# Patient Record
Sex: MALE | Race: BLACK OR AFRICAN AMERICAN | Employment: OTHER | URBAN - METROPOLITAN AREA
[De-identification: names, ages, dates, MRNs, and addresses within clinical notes are randomized per-mention and may not be internally consistent; named-entity substitution may affect disease eponyms.]

---

## 2017-01-23 ENCOUNTER — APPOINTMENT (OUTPATIENT)
Dept: CT IMAGING | Age: 72
End: 2017-01-23
Attending: EMERGENCY MEDICINE
Payer: MEDICARE

## 2017-01-23 ENCOUNTER — APPOINTMENT (OUTPATIENT)
Dept: GENERAL RADIOLOGY | Age: 72
End: 2017-01-23
Attending: EMERGENCY MEDICINE
Payer: MEDICARE

## 2017-01-23 ENCOUNTER — HOSPITAL ENCOUNTER (EMERGENCY)
Age: 72
Discharge: HOME | End: 2017-01-23
Attending: EMERGENCY MEDICINE
Payer: MEDICARE

## 2017-01-23 VITALS
WEIGHT: 175 LBS | HEART RATE: 62 BPM | HEIGHT: 68 IN | SYSTOLIC BLOOD PRESSURE: 114 MMHG | OXYGEN SATURATION: 99 % | DIASTOLIC BLOOD PRESSURE: 62 MMHG | TEMPERATURE: 98.2 F | BODY MASS INDEX: 26.52 KG/M2 | RESPIRATION RATE: 20 BRPM

## 2017-01-23 DIAGNOSIS — K86.89 PANCREATIC MASS: ICD-10-CM

## 2017-01-23 DIAGNOSIS — M89.8X5 LYTIC BONE LESION OF HIP: ICD-10-CM

## 2017-01-23 DIAGNOSIS — G89.29 CHRONIC MIDLINE LOW BACK PAIN WITHOUT SCIATICA: Primary | ICD-10-CM

## 2017-01-23 DIAGNOSIS — M54.50 CHRONIC MIDLINE LOW BACK PAIN WITHOUT SCIATICA: Primary | ICD-10-CM

## 2017-01-23 LAB
ALBUMIN SERPL BCP-MCNC: 3.8 G/DL (ref 3.2–4.6)
ALBUMIN/GLOB SERPL: 1.1 {RATIO} (ref 1.2–3.5)
ALP SERPL-CCNC: 83 U/L (ref 50–136)
ALT SERPL-CCNC: 27 U/L (ref 12–65)
ANION GAP BLD CALC-SCNC: 9 MMOL/L (ref 7–16)
AST SERPL W P-5'-P-CCNC: 17 U/L (ref 15–37)
ATRIAL RATE: 56 BPM
BACTERIA URNS QL MICRO: 0 /HPF
BASOPHILS # BLD AUTO: 0 K/UL (ref 0–0.2)
BASOPHILS # BLD: 1 % (ref 0–2)
BILIRUB SERPL-MCNC: 0.3 MG/DL (ref 0.2–1.1)
BUN SERPL-MCNC: 16 MG/DL (ref 8–23)
CALCIUM SERPL-MCNC: 8.6 MG/DL (ref 8.3–10.4)
CALCULATED P AXIS, ECG09: 74 DEGREES
CALCULATED R AXIS, ECG10: 48 DEGREES
CALCULATED T AXIS, ECG11: 34 DEGREES
CASTS URNS QL MICRO: NORMAL /LPF
CHLORIDE SERPL-SCNC: 109 MMOL/L (ref 98–107)
CO2 SERPL-SCNC: 27 MMOL/L (ref 21–32)
CREAT SERPL-MCNC: 1.57 MG/DL (ref 0.8–1.5)
DIAGNOSIS, 93000: NORMAL
DIASTOLIC BP, ECG02: NORMAL MMHG
DIFFERENTIAL METHOD BLD: ABNORMAL
EOSINOPHIL # BLD: 0.1 K/UL (ref 0–0.8)
EOSINOPHIL NFR BLD: 3 % (ref 0.5–7.8)
EPI CELLS #/AREA URNS HPF: 0 /HPF
ERYTHROCYTE [DISTWIDTH] IN BLOOD BY AUTOMATED COUNT: 13.7 % (ref 11.9–14.6)
FLUAV AG NPH QL IA: NEGATIVE
FLUBV AG NPH QL IA: NEGATIVE
GLOBULIN SER CALC-MCNC: 3.4 G/DL (ref 2.3–3.5)
GLUCOSE BLD STRIP.AUTO-MCNC: 85 MG/DL (ref 65–100)
GLUCOSE SERPL-MCNC: 47 MG/DL (ref 65–100)
HCT VFR BLD AUTO: 42 % (ref 41.1–50.3)
HGB BLD-MCNC: 14 G/DL (ref 13.6–17.2)
IMM GRANULOCYTES # BLD: 0 K/UL (ref 0–0.5)
IMM GRANULOCYTES NFR BLD AUTO: 0.3 % (ref 0–5)
LACTATE BLD-SCNC: 0.8 MMOL/L (ref 0.5–1.9)
LIPASE SERPL-CCNC: 136 U/L (ref 73–393)
LYMPHOCYTES # BLD AUTO: 47 % (ref 13–44)
LYMPHOCYTES # BLD: 1.9 K/UL (ref 0.5–4.6)
MCH RBC QN AUTO: 31.1 PG (ref 26.1–32.9)
MCHC RBC AUTO-ENTMCNC: 33.3 G/DL (ref 31.4–35)
MCV RBC AUTO: 93.3 FL (ref 79.6–97.8)
MONOCYTES # BLD: 0.3 K/UL (ref 0.1–1.3)
MONOCYTES NFR BLD AUTO: 8 % (ref 4–12)
NEUTS SEG # BLD: 1.6 K/UL (ref 1.7–8.2)
NEUTS SEG NFR BLD AUTO: 41 % (ref 43–78)
P-R INTERVAL, ECG05: 180 MS
PLATELET # BLD AUTO: 183 K/UL (ref 150–450)
PMV BLD AUTO: 11.4 FL (ref 10.8–14.1)
POTASSIUM SERPL-SCNC: 3.8 MMOL/L (ref 3.5–5.1)
PROT SERPL-MCNC: 7.2 G/DL (ref 6.3–8.2)
Q-T INTERVAL, ECG07: 424 MS
QRS DURATION, ECG06: 92 MS
QTC CALCULATION (BEZET), ECG08: 409 MS
RBC # BLD AUTO: 4.5 M/UL (ref 4.23–5.67)
RBC #/AREA URNS HPF: NORMAL /HPF
SODIUM SERPL-SCNC: 145 MMOL/L (ref 136–145)
SYSTOLIC BP, ECG01: NORMAL MMHG
TROPONIN I SERPL-MCNC: <0.02 NG/ML (ref 0.02–0.05)
VENTRICULAR RATE, ECG03: 56 BPM
WBC # BLD AUTO: 3.9 K/UL (ref 4.3–11.1)
WBC URNS QL MICRO: NORMAL /HPF

## 2017-01-23 PROCEDURE — 82962 GLUCOSE BLOOD TEST: CPT

## 2017-01-23 PROCEDURE — 81015 MICROSCOPIC EXAM OF URINE: CPT | Performed by: EMERGENCY MEDICINE

## 2017-01-23 PROCEDURE — 85025 COMPLETE CBC W/AUTO DIFF WBC: CPT | Performed by: EMERGENCY MEDICINE

## 2017-01-23 PROCEDURE — 84484 ASSAY OF TROPONIN QUANT: CPT | Performed by: EMERGENCY MEDICINE

## 2017-01-23 PROCEDURE — 99284 EMERGENCY DEPT VISIT MOD MDM: CPT | Performed by: EMERGENCY MEDICINE

## 2017-01-23 PROCEDURE — 93005 ELECTROCARDIOGRAM TRACING: CPT | Performed by: EMERGENCY MEDICINE

## 2017-01-23 PROCEDURE — 74177 CT ABD & PELVIS W/CONTRAST: CPT

## 2017-01-23 PROCEDURE — 87804 INFLUENZA ASSAY W/OPTIC: CPT | Performed by: EMERGENCY MEDICINE

## 2017-01-23 PROCEDURE — 74011250636 HC RX REV CODE- 250/636: Performed by: EMERGENCY MEDICINE

## 2017-01-23 PROCEDURE — 80053 COMPREHEN METABOLIC PANEL: CPT | Performed by: EMERGENCY MEDICINE

## 2017-01-23 PROCEDURE — 83690 ASSAY OF LIPASE: CPT | Performed by: EMERGENCY MEDICINE

## 2017-01-23 PROCEDURE — 96374 THER/PROPH/DIAG INJ IV PUSH: CPT | Performed by: EMERGENCY MEDICINE

## 2017-01-23 PROCEDURE — 83605 ASSAY OF LACTIC ACID: CPT

## 2017-01-23 PROCEDURE — 74011636320 HC RX REV CODE- 636/320: Performed by: EMERGENCY MEDICINE

## 2017-01-23 PROCEDURE — 71020 XR CHEST PA LAT: CPT

## 2017-01-23 PROCEDURE — 74011000258 HC RX REV CODE- 258: Performed by: EMERGENCY MEDICINE

## 2017-01-23 RX ORDER — HYDROCODONE BITARTRATE AND ACETAMINOPHEN 5; 325 MG/1; MG/1
1 TABLET ORAL
Qty: 20 TAB | Refills: 0 | Status: SHIPPED | OUTPATIENT
Start: 2017-01-23 | End: 2017-03-30 | Stop reason: ALTCHOICE

## 2017-01-23 RX ORDER — HYDROCODONE BITARTRATE AND ACETAMINOPHEN 5; 325 MG/1; MG/1
1 TABLET ORAL
Qty: 20 TAB | Refills: 0 | Status: SHIPPED | OUTPATIENT
Start: 2017-01-23 | End: 2017-01-23

## 2017-01-23 RX ORDER — SODIUM CHLORIDE 0.9 % (FLUSH) 0.9 %
5-10 SYRINGE (ML) INJECTION EVERY 8 HOURS
Status: DISCONTINUED | OUTPATIENT
Start: 2017-01-23 | End: 2017-01-23 | Stop reason: HOSPADM

## 2017-01-23 RX ORDER — SODIUM CHLORIDE 0.9 % (FLUSH) 0.9 %
5-10 SYRINGE (ML) INJECTION AS NEEDED
Status: DISCONTINUED | OUTPATIENT
Start: 2017-01-23 | End: 2017-01-23 | Stop reason: HOSPADM

## 2017-01-23 RX ORDER — SODIUM CHLORIDE 0.9 % (FLUSH) 0.9 %
10 SYRINGE (ML) INJECTION
Status: COMPLETED | OUTPATIENT
Start: 2017-01-23 | End: 2017-01-23

## 2017-01-23 RX ORDER — HYDROMORPHONE HYDROCHLORIDE 1 MG/ML
1 INJECTION, SOLUTION INTRAMUSCULAR; INTRAVENOUS; SUBCUTANEOUS
Status: COMPLETED | OUTPATIENT
Start: 2017-01-23 | End: 2017-01-23

## 2017-01-23 RX ADMIN — IOPAMIDOL 100 ML: 755 INJECTION, SOLUTION INTRAVENOUS at 17:37

## 2017-01-23 RX ADMIN — HYDROMORPHONE HYDROCHLORIDE 1 MG: 1 INJECTION, SOLUTION INTRAMUSCULAR; INTRAVENOUS; SUBCUTANEOUS at 15:30

## 2017-01-23 RX ADMIN — DIATRIZOATE MEGLUMINE AND DIATRIZOATE SODIUM 15 ML: 600; 100 SOLUTION ORAL; RECTAL at 15:30

## 2017-01-23 RX ADMIN — SODIUM CHLORIDE 100 ML: 900 INJECTION, SOLUTION INTRAVENOUS at 17:37

## 2017-01-23 RX ADMIN — Medication 10 ML: at 17:38

## 2017-01-23 NOTE — DISCHARGE INSTRUCTIONS
Learning About How to Have a Healthy Back  What causes back pain? Back pain is often caused by overuse, strain, or injury. For example, people often hurt their backs playing sports or working in the yard, being jolted in a car accident, or lifting something too heavy. Aging plays a part too. Your bones and muscles tend to lose strength as you age, which makes injury more likely. The spongy discs between the bones of the spine (vertebrae) may suffer from wear and tear and no longer provide enough cushion between the bones. A disc that bulges or breaks open (herniated disc) can press on nerves, causing back pain. In some people, back pain is the result of arthritis, broken vertebrae caused by bone loss (osteoporosis), illness, or a spine problem. Although most people have back pain at one time or another, there are steps you can take to make it less likely. How can you have a healthy back? Reduce stress on your back through good posture  Slumping or slouching alone may not cause low back pain. But after the back has been strained or injured, bad posture can make pain worse. · Sleep in a position that maintains your back's normal curves and on a mattress that feels comfortable. Sleep on your side with a pillow between your knees, or sleep on your back with a pillow under your knees. These positions can reduce strain on your back. · Stand and sit up straight. \"Good posture\" generally means your ears, shoulders, and hips are in a straight line. · If you must stand for a long time, put one foot on a stool, ledge, or box. Switch feet every now and then. · Sit in a chair that is low enough to let you place both feet flat on the floor with both knees nearly level with your hips. If your chair or desk is too high, use a footrest to raise your knees. Place a small pillow, a rolled-up towel, or a lumbar roll in the curve of your back if you need extra support.   · Try a kneeling chair, which helps tilt your hips forward. This takes pressure off your lower back. · Try sitting on an exercise ball. It can rock from side to side, which helps keep your back loose. · When driving, keep your knees nearly level with your hips. Sit straight, and drive with both hands on the steering wheel. Your arms should be in a slightly bent position. Reduce stress on your back through careful lifting  · Squat down, bending at the hips and knees only. If you need to, put one knee to the floor and extend your other knee in front of you, bent at a right angle (half kneeling). · Press your chest straight forward. This helps keep your upper back straight while keeping a slight arch in your low back. · Hold the load as close to your body as possible, at the level of your belly button (navel). · Use your feet to change direction, taking small steps. · Lead with your hips as you change direction. Keep your shoulders in line with your hips as you move. · Set down your load carefully, squatting with your knees and hips only. Exercise and stretch your back  · Do some exercise on most days of the week, if your doctor says it is okay. You can walk, run, swim, or cycle. · Stretch your back muscles. Here are a few exercises to try:  Kip Isauro on your back, and gently pull one bent knee to your chest. Put that foot back on the floor, and then pull the other knee to your chest.  ¨ Do pelvic tilts. Lie on your back with your knees bent. Tighten your stomach muscles. Pull your belly button (navel) in and up toward your ribs. You should feel like your back is pressing to the floor and your hips and pelvis are slightly lifting off the floor. Hold for 6 seconds while breathing smoothly. ¨ Sit with your back flat against a wall. · Keep your core muscles strong. The muscles of your back, belly (abdomen), and buttocks support your spine. ¨ Pull in your belly and imagine pulling your navel toward your spine. Hold this for 6 seconds, then relax.  Remember to keep breathing normally as you tense your muscles. ¨ Do curl-ups. Always do them with your knees bent. Keep your low back on the floor, and curl your shoulders toward your knees using a smooth, slow motion. Keep your arms folded across your chest. If this bothers your neck, try putting your hands behind your neck (not your head), with your elbows spread apart. ¨ Lie on your back with your knees bent and your feet flat on the floor. Tighten your belly muscles, and then push with your feet and raise your buttocks up a few inches. Hold this position 6 seconds as you continue to breathe normally, then lower yourself slowly to the floor. Repeat 8 to 12 times. ¨ If you like group exercise, try Pilates or yoga. These classes have poses that strengthen the core muscles. Lead a healthy lifestyle  · Stay at a healthy weight to avoid strain on your back. · Do not smoke. Smoking increases the risk of osteoporosis, which weakens the spine. If you need help quitting, talk to your doctor about stop-smoking programs and medicines. These can increase your chances of quitting for good. Where can you learn more? Go to http://deirdreKaiimatonny.info/. Enter L315 in the search box to learn more about \"Learning About How to Have a Healthy Back. \"  Current as of: May 23, 2016  Content Version: 11.1  © 9772-2008 [employer]. Care instructions adapted under license by [employer] (which disclaims liability or warranty for this information). If you have questions about a medical condition or this instruction, always ask your healthcare professional. [place] [address] any warranty or liability for your use of this information. Back Pain, Emergency or Urgent Symptoms: Care Instructions  Your Care Instructions  Many people have back pain at one time or another.  In most cases, pain gets better with self-care that includes over-the-counter pain medicine, ice, heat, and exercises. Unless you have symptoms of a severe injury or heart attack, you may be able to give yourself a few days before you call a doctor. But some back problems are very serious. Do not ignore symptoms that need to be checked right away. Follow-up care is a key part of your treatment and safety. Be sure to make and go to all appointments, and call your doctor if you are having problems. It's also a good idea to know your test results and keep a list of the medicines you take. How can you care for yourself at home? · Sit or lie in positions that are most comfortable and that reduce your pain. Try one of these positions when you lie down:  ¨ Lie on your back with your knees bent and supported by large pillows. ¨ Lie on the floor with your legs on the seat of a sofa or chair. [place] on your side with your knees and hips bent and a pillow between your legs. ¨ Lie on your stomach if it does not make pain worse. · Do not sit up in bed, and avoid soft couches and twisted positions. Bed rest can help relieve pain at first, but it delays healing. Avoid bed rest after the first day. · Change positions every 30 minutes. If you must sit for long periods of time, take breaks from sitting. Get up and walk around, or lie flat. · Try using a heating pad on a low or medium setting, for 15 to 20 minutes every 2 or 3 hours. Try a warm shower in place of one session with the heating pad. You can also buy single-use heat wraps that last up to 8 hours. You can also try ice or cold packs on your back for 10 to 20 minutes at a time, several times a day. (Put a thin cloth between the ice pack and your skin.) This reduces pain and makes it easier to be active and exercise. · Take pain medicines exactly as directed. ¨ If the doctor gave you a prescription medicine for pain, take it as prescribed. ¨ If you are not taking a prescription pain medicine, ask your doctor if you can take an over-the-counter medicine.   When should you call for help? Call 911 anytime you think you may need emergency care. For example, call if:  · You are unable to move a leg at all. · You have back pain with severe belly pain. · You have symptoms of a heart attack. These may include:  ¨ Chest pain or pressure, or a strange feeling in the chest.  ¨ Sweating. ¨ Shortness of breath. ¨ Nausea or vomiting. ¨ Pain, pressure, or a strange feeling in the back, neck, jaw, or upper belly or in one or both shoulders or arms. ¨ Lightheadedness or sudden weakness. ¨ A fast or irregular heartbeat. After you call 911, the  may tell you to chew 1 adult-strength or 2 to 4 low-dose aspirin. Wait for an ambulance. Do not try to drive yourself. Call your doctor now or seek immediate medical care if:  · You have new or worse symptoms in your arms, legs, chest, belly, or buttocks. Symptoms may include:  ¨ Numbness or tingling. ¨ Weakness. ¨ Pain. · You lose bladder or bowel control. · You have back pain and:  ¨ You have injured your back while lifting or doing some other activity. Call if the pain is severe, has not gone away after 1 or 2 days, and you cannot do your normal daily activities. ¨ You have had a back injury before that needed treatment. ¨ Your pain has lasted longer than 4 weeks. ¨ You have had weight loss you cannot explain. ¨ You are age 48 or older. ¨ You have cancer now or have had it before. Watch closely for changes in your health, and be sure to contact your doctor if you are not getting better as expected. Where can you learn more? Go to http://deirdre-tonny.info/. Enter Y704 in the search box to learn more about \"Back Pain, Emergency or Urgent Symptoms: Care Instructions. \"  Current as of: May 27, 2016  Content Version: 11.1  © 5510-7871 [employer]. Care instructions adapted under license by [facility] (which disclaims liability or warranty for this information).  If you have questions about a medical condition or this instruction, always ask your healthcare professional. [address] any warranty or liability for your use of this information. Learning About Abnormal X-Ray Results  What is an X-ray? An X-ray is a picture of the inside of your body. The X-ray may show bones, organs, or pockets of air or fluid. It may also show objects that have gotten into the body, like coins or nails. Any part of your body can be X-rayed, including your head, chest, belly, arms, and legs. An X-ray is only one piece of information about your health. Your doctor considers many things when looking at an X-ray. These things may include your symptoms, your age, your weight, a physical exam, and your medical and family history. That's why it's important to talk to your doctor. He or she can give you a clear sense of what the X-ray means for you. It's also a good idea to learn a little about X-rays in general.  What does it mean to have an abnormal X-ray? X-rays don't show everything. Muscles and ropy fibers (ligaments) don't show up in a useful way on an X-ray. And some problems, like a bleeding stomach ulcer, don't show up on X-rays. If your X-ray doesn't give a clear picture, you may need other tests. For example, you may need a CT scan, an ultrasound, an endoscopy, or an MRI scan. Sometimes an X-ray can suggest a problem when there isn't one. Several things can cause an abnormal result, including small growths (nodules) which may not cause any harm. But without further tests, your doctor can't tell whether an abnormal finding is a harmless nodule, cancer, or something else. What are questions to ask your doctor about your X-ray? Making sense of your X-ray involves more than just seeing the image. Your doctor can tell you what your X-ray means for you and your health. When you talk with your doctor, you can ask questions like:  · Do you think this is an accurate picture?  Are there reasons this X-ray might be wrong? · What does this X-ray show us about my health? · What's my next step? Follow-up care is a key part of your treatment and safety. Be sure to make and go to all appointments, and call your doctor if you are having problems. It's also a good idea to know your test results and keep a list of the medicines you take. Where can you learn more? Go to http://deirdre-tonny.info/. Enter W864 in the search box to learn more about \"Learning About Abnormal X-Ray Results. \"  Current as of: February 19, 2016  Content Version: 11.1  © 0349-1973 [employer]. Care instructions adapted under license by [email or web address] (which disclaims liability or warranty for this information). If you have questions about a medical condition or this instruction, always ask your healthcare professional. [address] any warranty or liability for your use of this information.

## 2017-01-23 NOTE — ED PROVIDER NOTES
HPI Comments: Presents with complaint of left upper quadrant pain and chronic back pain. Patient reports this pain has been going on for 6 months but wife made him come in today. He has no difficulty eating or drinking. He complains of minimal his back pain and difficulty walking for long periods. Wife reports he has difficulty with urinary stream but no bowel incontinence and no numbness in his legs or pelvic area. He's had several injections per the wife. Patient is a 66 y.o. male presenting with abdominal pain. The history is provided by the patient. Abdominal Pain    This is a new problem. The current episode started more than 1 week ago. The problem occurs constantly. The problem has not changed since onset. The pain is located in the LUQ. The quality of the pain is aching. The pain is moderate. Pertinent negatives include no fever, no diarrhea, no nausea, no vomiting, no constipation, no dysuria and no frequency. Nothing worsens the pain. The pain is relieved by nothing. Past workup includes CT scan. The patient's surgical history non-contributory. Past Medical History:   Diagnosis Date    CAD (coronary artery disease)     Chronic back pain     Hypertension     Stroke ([facility] [address]      w/ L side effected       Past Surgical History:   Procedure Laterality Date    Hx heent       repair to L surrounding eye tissues         History reviewed. No pertinent family history. Social History     Social History    Marital status:      Spouse name: N/A    Number of children: N/A    Years of education: N/A     Occupational History    Not on file.      Social History Main Topics    Smoking status: Current Every Day Smoker     Packs/day: 0.25    Smokeless tobacco: Not on file      Comment: 2 cig/ 3 days    Alcohol use Yes      Comment: occsaional    Drug use: No    Sexual activity: Not on file     Other Topics Concern    Not on file     Social History Narrative    ** Merged History Encounter **              ALLERGIES: Pcn [penicillins] and Pcn [penicillins]    Review of Systems   Constitutional: Negative for chills and fever. Gastrointestinal: Positive for abdominal pain. Negative for constipation, diarrhea, nausea and vomiting. Genitourinary: Negative for dysuria and frequency. All other systems reviewed and are negative. Vitals:    01/23/17 1322   BP: 112/68   Pulse: (!) 58   Resp: 18   Temp: 98.6 °F (37 °C)   SpO2: 97%   Weight: 79.4 kg (175 lb)   Height: 5' 8\" (1.727 m)            Physical Exam   Constitutional: He is oriented to person, place, and time. He appears well-developed and well-nourished. No distress. HENT:   Head: Normocephalic and atraumatic. Neck: Normal range of motion. Neck supple. Cardiovascular: Normal rate and regular rhythm. Pulmonary/Chest: Effort normal and breath sounds normal. No respiratory distress. He has no wheezes. He has no rales. Abdominal: Soft. He exhibits no distension. There is no tenderness. There is no rebound and no guarding. Musculoskeletal: He exhibits tenderness (TTP low lumbar ). He exhibits no edema. Pain with sitting up   Neurological: He is alert and oriented to person, place, and time. No cranial nerve deficit. Skin: Skin is warm and dry. No rash noted. He is not diaphoretic. No erythema. Psychiatric: He has a normal mood and affect. His behavior is normal.   Nursing note and vitals reviewed. MDM  Number of Diagnoses or Management Options  Chronic midline low back pain without sciatica:   Lytic bone lesion of hip:   Pancreatic mass:   Diagnosis management comments: I reviewed patient's chart. His CT scan April that showed a pancreatic mass. He has not had any follow-up either here or at THE [facility] for this. He has been seen for his low back pain multiple times and has a history of spinal stenosis. An MRI 1 year ago was neg for mass. Pt ws apparently unaware of CT finding with pancreatic mass.   His repeat CT showed no change. I discussed this with him. I talked to him and his wife that he needs urgent follow-up with oncology but the fact that there was no significant change since April was a good sign. They stated understanding. I discussed with them signs of cauda equina syndrome and reasons to return to the ED. I also discussed with them that if they had any issues getting into the oncology clinic to call up here and we would arrange follow-up.        Amount and/or Complexity of Data Reviewed  Clinical lab tests: ordered and reviewed  Review and summarize past medical records: yes  Discuss the patient with other providers: yes    Risk of Complications, Morbidity, and/or Mortality  Presenting problems: high  Diagnostic procedures: moderate  Management options: moderate    Patient Progress  Patient progress: improved    ED Course       Procedures

## 2017-01-23 NOTE — ED TRIAGE NOTES
Pt arrives POV from home c/o productive cough with green sputum x 4 weeks, back pain x 2 weeks, intermittent chest pain since this morning.

## 2017-01-23 NOTE — ED NOTES
Discharge instructions and prescriptions provided and explained to the pt and wife. Opportunity for questions provided. A signed copy of AVS was placed in chart. Patient ambulatory to the car. Patient's wife is driving him home du to receiving pain.

## 2017-02-01 ENCOUNTER — HOSPITAL ENCOUNTER (OUTPATIENT)
Dept: LAB | Age: 72
Discharge: HOME | End: 2017-02-01
Payer: MEDICARE

## 2017-02-01 DIAGNOSIS — K86.89 PANCREATIC MASS: ICD-10-CM

## 2017-02-01 LAB
ALBUMIN SERPL BCP-MCNC: 4 G/DL (ref 3.2–4.6)
ALBUMIN/GLOB SERPL: 1.1 {RATIO} (ref 1.2–3.5)
ALP SERPL-CCNC: 85 U/L (ref 50–136)
ALT SERPL-CCNC: 22 U/L (ref 12–65)
ANION GAP BLD CALC-SCNC: 3 MMOL/L (ref 7–16)
APTT PPP: 30.5 SEC (ref 23.5–31.7)
AST SERPL W P-5'-P-CCNC: 14 U/L (ref 15–37)
BASOPHILS # BLD AUTO: 0 K/UL (ref 0–0.2)
BASOPHILS # BLD: 0 % (ref 0–2)
BILIRUB SERPL-MCNC: 0.3 MG/DL (ref 0.2–1.1)
BUN SERPL-MCNC: 10 MG/DL (ref 8–23)
CALCIUM SERPL-MCNC: 9 MG/DL (ref 8.3–10.4)
CANCER AG19-9 SERPL-ACNC: 16 U/ML (ref 2–37)
CEA SERPL-MCNC: 1.1 NG/ML (ref 0–3)
CHLORIDE SERPL-SCNC: 106 MMOL/L (ref 98–107)
CO2 SERPL-SCNC: 29 MMOL/L (ref 23–32)
CREAT SERPL-MCNC: 1.51 MG/DL (ref 0.8–1.5)
DIFFERENTIAL METHOD BLD: ABNORMAL
EOSINOPHIL # BLD: 0.2 K/UL (ref 0–0.8)
EOSINOPHIL NFR BLD: 3 % (ref 0.5–7.8)
ERYTHROCYTE [DISTWIDTH] IN BLOOD BY AUTOMATED COUNT: 13.3 % (ref 11.9–14.6)
GLOBULIN SER CALC-MCNC: 3.8 G/DL (ref 2.3–3.5)
GLUCOSE SERPL-MCNC: 106 MG/DL (ref 65–100)
HCT VFR BLD AUTO: 41.4 % (ref 41.1–50.3)
HGB BLD-MCNC: 13.6 G/DL (ref 13.6–17.2)
INR PPP: 1 (ref 0.9–1.2)
LYMPHOCYTES # BLD AUTO: 27 % (ref 13–44)
LYMPHOCYTES # BLD: 1.6 K/UL (ref 0.5–4.6)
MAGNESIUM SERPL-MCNC: 2.3 MG/DL (ref 1.8–2.4)
MCH RBC QN AUTO: 30.4 PG (ref 26.1–32.9)
MCHC RBC AUTO-ENTMCNC: 32.9 G/DL (ref 31.4–35)
MCV RBC AUTO: 92.6 FL (ref 79.6–97.8)
MONOCYTES # BLD: 0.4 K/UL (ref 0.1–1.3)
MONOCYTES NFR BLD AUTO: 6 % (ref 4–12)
NEUTS SEG # BLD: 3.9 K/UL (ref 1.7–8.2)
NEUTS SEG NFR BLD AUTO: 64 % (ref 43–78)
NRBC # BLD: 0 K/UL (ref 0–0.2)
PLATELET # BLD AUTO: 177 K/UL (ref 150–450)
PMV BLD AUTO: 10.7 FL (ref 10.8–14.1)
POTASSIUM SERPL-SCNC: 4 MMOL/L (ref 3.5–5.1)
PROT SERPL-MCNC: 7.8 G/DL (ref 6.3–8.2)
PROTHROMBIN TIME: 10.6 SEC (ref 9.6–12)
RBC # BLD AUTO: 4.47 M/UL (ref 4.23–5.67)
SODIUM SERPL-SCNC: 138 MMOL/L (ref 136–145)
WBC # BLD AUTO: 6.1 K/UL (ref 4.3–11.1)

## 2017-02-01 PROCEDURE — 85025 COMPLETE CBC W/AUTO DIFF WBC: CPT | Performed by: INTERNAL MEDICINE

## 2017-02-01 PROCEDURE — 36415 COLL VENOUS BLD VENIPUNCTURE: CPT | Performed by: INTERNAL MEDICINE

## 2017-02-01 PROCEDURE — 85730 THROMBOPLASTIN TIME PARTIAL: CPT | Performed by: INTERNAL MEDICINE

## 2017-02-01 PROCEDURE — 80053 COMPREHEN METABOLIC PANEL: CPT | Performed by: INTERNAL MEDICINE

## 2017-02-01 PROCEDURE — 83735 ASSAY OF MAGNESIUM: CPT | Performed by: INTERNAL MEDICINE

## 2017-02-01 PROCEDURE — 86301 IMMUNOASSAY TUMOR CA 19-9: CPT | Performed by: INTERNAL MEDICINE

## 2017-02-01 PROCEDURE — 82378 CARCINOEMBRYONIC ANTIGEN: CPT | Performed by: INTERNAL MEDICINE

## 2017-02-01 PROCEDURE — 85610 PROTHROMBIN TIME: CPT | Performed by: INTERNAL MEDICINE

## 2017-03-30 ENCOUNTER — HOSPITAL ENCOUNTER (OUTPATIENT)
Dept: LAB | Age: 72
Discharge: HOME | End: 2017-03-30
Payer: MEDICARE

## 2017-03-30 DIAGNOSIS — K86.89 PANCREATIC MASS: ICD-10-CM

## 2017-03-30 LAB
ALBUMIN SERPL BCP-MCNC: 4.2 G/DL (ref 3.2–4.6)
ALBUMIN/GLOB SERPL: 1.1 {RATIO} (ref 1.2–3.5)
ALP SERPL-CCNC: 85 U/L (ref 50–136)
ALT SERPL-CCNC: 27 U/L (ref 12–65)
ANION GAP BLD CALC-SCNC: 5 MMOL/L (ref 7–16)
AST SERPL W P-5'-P-CCNC: 16 U/L (ref 15–37)
BASOPHILS # BLD AUTO: 0 K/UL (ref 0–0.2)
BASOPHILS # BLD: 1 % (ref 0–2)
BILIRUB SERPL-MCNC: 0.3 MG/DL (ref 0.2–1.1)
BUN SERPL-MCNC: 17 MG/DL (ref 8–23)
CALCIUM SERPL-MCNC: 9.4 MG/DL (ref 8.3–10.4)
CHLORIDE SERPL-SCNC: 111 MMOL/L (ref 98–107)
CO2 SERPL-SCNC: 27 MMOL/L (ref 23–32)
CREAT SERPL-MCNC: 1.64 MG/DL (ref 0.8–1.5)
DIFFERENTIAL METHOD BLD: NORMAL
EOSINOPHIL # BLD: 0 K/UL (ref 0–0.8)
EOSINOPHIL NFR BLD: 1 % (ref 0.5–7.8)
ERYTHROCYTE [DISTWIDTH] IN BLOOD BY AUTOMATED COUNT: 14.2 % (ref 11.9–14.6)
GLOBULIN SER CALC-MCNC: 3.7 G/DL (ref 2.3–3.5)
GLUCOSE SERPL-MCNC: 95 MG/DL (ref 65–100)
HCT VFR BLD AUTO: 41.3 % (ref 41.1–50.3)
HGB BLD-MCNC: 13.9 G/DL (ref 13.6–17.2)
LYMPHOCYTES # BLD AUTO: 43 % (ref 13–44)
LYMPHOCYTES # BLD: 2.6 K/UL (ref 0.5–4.6)
MAGNESIUM SERPL-MCNC: 2.4 MG/DL (ref 1.8–2.4)
MCH RBC QN AUTO: 30.8 PG (ref 26.1–32.9)
MCHC RBC AUTO-ENTMCNC: 33.7 G/DL (ref 31.4–35)
MCV RBC AUTO: 91.6 FL (ref 79.6–97.8)
MONOCYTES # BLD: 0.4 K/UL (ref 0.1–1.3)
MONOCYTES NFR BLD AUTO: 7 % (ref 4–12)
NEUTS SEG # BLD: 3.1 K/UL (ref 1.7–8.2)
NEUTS SEG NFR BLD AUTO: 50 % (ref 43–78)
NRBC # BLD: 0 K/UL (ref 0–0.2)
PLATELET # BLD AUTO: 189 K/UL (ref 150–450)
PMV BLD AUTO: 11.1 FL (ref 10.8–14.1)
POTASSIUM SERPL-SCNC: 3.8 MMOL/L (ref 3.5–5.1)
PROT SERPL-MCNC: 7.9 G/DL (ref 6.3–8.2)
RBC # BLD AUTO: 4.51 M/UL (ref 4.23–5.67)
SODIUM SERPL-SCNC: 143 MMOL/L (ref 136–145)
WBC # BLD AUTO: 6.2 K/UL (ref 4.3–11.1)

## 2017-03-30 PROCEDURE — 85025 COMPLETE CBC W/AUTO DIFF WBC: CPT | Performed by: INTERNAL MEDICINE

## 2017-03-30 PROCEDURE — 36415 COLL VENOUS BLD VENIPUNCTURE: CPT | Performed by: INTERNAL MEDICINE

## 2017-03-30 PROCEDURE — 80053 COMPREHEN METABOLIC PANEL: CPT | Performed by: INTERNAL MEDICINE

## 2017-03-30 PROCEDURE — 83735 ASSAY OF MAGNESIUM: CPT | Performed by: INTERNAL MEDICINE

## 2018-01-15 ENCOUNTER — HOSPITAL ENCOUNTER (EMERGENCY)
Age: 73
Discharge: LEFT BEFORE DISCHARGE | End: 2018-01-15
Attending: EMERGENCY MEDICINE
Payer: MEDICARE

## 2018-01-15 VITALS
SYSTOLIC BLOOD PRESSURE: 143 MMHG | HEART RATE: 68 BPM | OXYGEN SATURATION: 98 % | DIASTOLIC BLOOD PRESSURE: 88 MMHG | WEIGHT: 168 LBS | HEIGHT: 66 IN | BODY MASS INDEX: 27 KG/M2 | TEMPERATURE: 98.2 F | RESPIRATION RATE: 18 BRPM

## 2018-01-15 PROCEDURE — 75810000275 HC EMERGENCY DEPT VISIT NO LEVEL OF CARE: Performed by: EMERGENCY MEDICINE

## 2018-01-28 ENCOUNTER — HOSPITAL ENCOUNTER (EMERGENCY)
Age: 73
Discharge: HOME | End: 2018-01-28
Payer: MEDICARE

## 2018-01-28 VITALS
SYSTOLIC BLOOD PRESSURE: 148 MMHG | HEART RATE: 88 BPM | WEIGHT: 160 LBS | DIASTOLIC BLOOD PRESSURE: 95 MMHG | BODY MASS INDEX: 24.25 KG/M2 | TEMPERATURE: 97.9 F | OXYGEN SATURATION: 96 % | RESPIRATION RATE: 18 BRPM | HEIGHT: 68 IN

## 2018-01-28 DIAGNOSIS — M54.50 ACUTE LOW BACK PAIN WITHOUT SCIATICA, UNSPECIFIED BACK PAIN LATERALITY: Primary | ICD-10-CM

## 2018-01-28 DIAGNOSIS — F03.90 DEMENTIA WITHOUT BEHAVIORAL DISTURBANCE, UNSPECIFIED DEMENTIA TYPE: ICD-10-CM

## 2018-01-28 PROCEDURE — 99283 EMERGENCY DEPT VISIT LOW MDM: CPT | Performed by: EMERGENCY MEDICINE

## 2018-01-28 PROCEDURE — 74011250637 HC RX REV CODE- 250/637: Performed by: EMERGENCY MEDICINE

## 2018-01-28 RX ORDER — ACETAMINOPHEN 325 MG/1
650 TABLET ORAL ONCE
Status: COMPLETED | OUTPATIENT
Start: 2018-01-28 | End: 2018-01-28

## 2018-01-28 RX ADMIN — ACETAMINOPHEN 650 MG: 325 TABLET, FILM COATED ORAL at 08:15

## 2018-01-28 NOTE — ED PROVIDER NOTES
HPI Comments: She is a 69-year-old male with history of dementia and some chronic back pain who presents to the ER this morning complaining of lower back pain. He is a very poor historian because of his dementia. There is no other family or anyone else available. He is unsure how he got to the ER. He denies any fall or injury. He denies bowel or bladder incontinence. Patient is a 67 y.o. male presenting with back pain. The history is provided by the patient. Back Pain    This is a new problem. The current episode started 1 to 2 hours ago. The problem occurs constantly. Patient reports not work related injury. The pain is associated with no known injury. The pain is present in the lumbar spine. The pain is mild. Pertinent negatives include no chest pain, no abdominal pain, no bowel incontinence and no bladder incontinence. He has tried nothing for the symptoms. Past Medical History:   Diagnosis Date    CAD (coronary artery disease)     Chronic back pain     Hypertension     Stroke ([address]     w/ L side effected       Past Surgical History:   Procedure Laterality Date    HX HEENT      repair to L surrounding eye tissues         No family history on file. Social History     Social History    Marital status:      Spouse name: N/A    Number of children: N/A    Years of education: N/A     Occupational History    Not on file. Social History Main Topics    Smoking status: Former Smoker     Packs/day: 0.25     Years: 60.00     Quit date: 1/16/2017    Smokeless tobacco: Not on file    Alcohol use No      Comment: occsaional    Drug use: No    Sexual activity: Not on file     Other Topics Concern    Not on file     Social History Narrative    ** Merged History Encounter **              ALLERGIES: Pcn [penicillins] and Pcn [penicillins]    Review of Systems   Constitutional: Negative. HENT: Negative. Eyes: Negative. Respiratory: Negative.     Cardiovascular: Negative for chest pain.   Gastrointestinal: Negative for abdominal pain and bowel incontinence. Endocrine: Negative. Genitourinary: Negative. Negative for bladder incontinence. Musculoskeletal: Positive for back pain. Skin: Negative. Vitals:    01/28/18 0644   BP: (!) 148/95   Pulse: 88   Resp: 18   Temp: 97.9 °F (36.6 °C)   SpO2: 96%   Weight: 72.6 kg (160 lb)   Height: 5' 8\" (1.727 m)            Physical Exam   Constitutional: He appears well-developed and well-nourished.   standing and ambulating in the room. HENT:   Head: Normocephalic and atraumatic. Eyes: EOM are normal. Pupils are equal, round, and reactive to light. Neck: Normal range of motion. Neck supple. No cervical spine tenderness   Cardiovascular: Normal rate and regular rhythm. Pulmonary/Chest: Effort normal and breath sounds normal.   Abdominal: Soft. There is no tenderness. Neurological: He is alert. He has normal strength. No cranial nerve deficit or sensory deficit. GCS eye subscore is 4. GCS verbal subscore is 5. GCS motor subscore is 6. Demented but pleasant oriented to place and name only   Skin: Skin is warm and dry. Nursing note and vitals reviewed. MDM  Number of Diagnoses or Management Options  Diagnosis management comments: Differential diagnosis includes dementia, musculoskeletal back pain       Amount and/or Complexity of Data Reviewed  Review and summarize past medical records: yes    Risk of Complications, Morbidity, and/or Mortality  Presenting problems: low  Diagnostic procedures: minimal  Management options: minimal    Patient Progress  Patient progress: stable    ED Course     8:14 AM  She has a normal neuro exam and no obvious deficits here. Tylenol was ordered for the pain. The nurse was able to contact his wife at home and we will arrange for transport back to the home. She is at their house and is expecting him. Voice dictation software was used during the making of this note.   This software is not perfect and grammatical and other typographical errors may be present. This note has been proofread, but may still contain errors.   Charlcie Halsted, MD; 1/28/2018 @8:15 AM   ===================================================================        Procedures

## 2018-01-28 NOTE — ED NOTES
I have reviewed discharge instructions with the patient. The patient verbalized understanding. Patient left ED via Discharge Method: ambulatory to Home with florian transport    Opportunity for questions and clarification provided. Patient given 0 scripts. To continue your aftercare when you leave the hospital, you may receive an automated call from our care team to check in on how you are doing. This is a free service and part of our promise to provide the best care and service to meet your aftercare needs.  If you have questions, or wish to unsubscribe from this service please call [phone]. Thank you for Choosing our [facility] Emergency [facility].

## 2018-01-28 NOTE — ED NOTES
This RN spoke to Wife, Karis Cornejo, who stated she has no transportation. Wife states she is at home and is ok with pt coming back. Daughter Jonathan Curtis did not answer when this RN attempted to call 3 times.

## 2018-04-08 ENCOUNTER — APPOINTMENT (OUTPATIENT)
Dept: GENERAL RADIOLOGY | Age: 73
End: 2018-04-08
Attending: EMERGENCY MEDICINE
Payer: MEDICARE

## 2018-04-08 ENCOUNTER — HOSPITAL ENCOUNTER (EMERGENCY)
Age: 73
Discharge: HOME | End: 2018-04-08
Attending: EMERGENCY MEDICINE
Payer: MEDICARE

## 2018-04-08 VITALS
HEART RATE: 83 BPM | TEMPERATURE: 99.3 F | WEIGHT: 160 LBS | OXYGEN SATURATION: 99 % | DIASTOLIC BLOOD PRESSURE: 90 MMHG | RESPIRATION RATE: 18 BRPM | SYSTOLIC BLOOD PRESSURE: 154 MMHG | HEIGHT: 68 IN | BODY MASS INDEX: 24.25 KG/M2

## 2018-04-08 DIAGNOSIS — R07.89 ATYPICAL CHEST PAIN: Primary | ICD-10-CM

## 2018-04-08 LAB
ALBUMIN SERPL-MCNC: 3.7 G/DL (ref 3.2–4.6)
ALBUMIN/GLOB SERPL: 1.1 {RATIO} (ref 1.2–3.5)
ALP SERPL-CCNC: 84 U/L (ref 50–136)
ALT SERPL-CCNC: 28 U/L (ref 12–65)
ANION GAP SERPL CALC-SCNC: 7 MMOL/L (ref 7–16)
AST SERPL-CCNC: 19 U/L (ref 15–37)
ATRIAL RATE: 76 BPM
BASOPHILS # BLD: 0 K/UL (ref 0–0.2)
BASOPHILS NFR BLD: 1 % (ref 0–2)
BILIRUB SERPL-MCNC: 0.3 MG/DL (ref 0.2–1.1)
BUN SERPL-MCNC: 11 MG/DL (ref 8–23)
CALCIUM SERPL-MCNC: 8.7 MG/DL (ref 8.3–10.4)
CALCULATED P AXIS, ECG09: 63 DEGREES
CALCULATED R AXIS, ECG10: 38 DEGREES
CALCULATED T AXIS, ECG11: 20 DEGREES
CHLORIDE SERPL-SCNC: 114 MMOL/L (ref 98–107)
CO2 SERPL-SCNC: 25 MMOL/L (ref 21–32)
CREAT SERPL-MCNC: 1.49 MG/DL (ref 0.8–1.5)
DIAGNOSIS, 93000: NORMAL
DIFFERENTIAL METHOD BLD: ABNORMAL
EOSINOPHIL # BLD: 0.2 K/UL (ref 0–0.8)
EOSINOPHIL NFR BLD: 5 % (ref 0.5–7.8)
ERYTHROCYTE [DISTWIDTH] IN BLOOD BY AUTOMATED COUNT: 13.9 % (ref 11.9–14.6)
GLOBULIN SER CALC-MCNC: 3.4 G/DL (ref 2.3–3.5)
GLUCOSE SERPL-MCNC: 136 MG/DL (ref 65–100)
HCT VFR BLD AUTO: 38 % (ref 41.1–50.3)
HGB BLD-MCNC: 12.9 G/DL (ref 13.6–17.2)
IMM GRANULOCYTES # BLD: 0 K/UL (ref 0–0.5)
IMM GRANULOCYTES NFR BLD AUTO: 0 % (ref 0–5)
LYMPHOCYTES # BLD: 1.4 K/UL (ref 0.5–4.6)
LYMPHOCYTES NFR BLD: 36 % (ref 13–44)
MCH RBC QN AUTO: 31.2 PG (ref 26.1–32.9)
MCHC RBC AUTO-ENTMCNC: 33.9 G/DL (ref 31.4–35)
MCV RBC AUTO: 91.8 FL (ref 79.6–97.8)
MONOCYTES # BLD: 0.2 K/UL (ref 0.1–1.3)
MONOCYTES NFR BLD: 6 % (ref 4–12)
NEUTS SEG # BLD: 1.9 K/UL (ref 1.7–8.2)
NEUTS SEG NFR BLD: 52 % (ref 43–78)
P-R INTERVAL, ECG05: 142 MS
PLATELET # BLD AUTO: 154 K/UL (ref 150–450)
PMV BLD AUTO: 11.5 FL (ref 10.8–14.1)
POTASSIUM SERPL-SCNC: 3.6 MMOL/L (ref 3.5–5.1)
PROT SERPL-MCNC: 7.1 G/DL (ref 6.3–8.2)
Q-T INTERVAL, ECG07: 384 MS
QRS DURATION, ECG06: 84 MS
QTC CALCULATION (BEZET), ECG08: 432 MS
RBC # BLD AUTO: 4.14 M/UL (ref 4.23–5.67)
SODIUM SERPL-SCNC: 146 MMOL/L (ref 136–145)
TROPONIN I BLD-MCNC: 0 NG/ML (ref 0.02–0.05)
TROPONIN I SERPL-MCNC: <0.02 NG/ML (ref 0.02–0.05)
VENTRICULAR RATE, ECG03: 76 BPM
WBC # BLD AUTO: 3.8 K/UL (ref 4.3–11.1)

## 2018-04-08 PROCEDURE — 84484 ASSAY OF TROPONIN QUANT: CPT | Performed by: EMERGENCY MEDICINE

## 2018-04-08 PROCEDURE — 80053 COMPREHEN METABOLIC PANEL: CPT | Performed by: EMERGENCY MEDICINE

## 2018-04-08 PROCEDURE — 71046 X-RAY EXAM CHEST 2 VIEWS: CPT

## 2018-04-08 PROCEDURE — 85025 COMPLETE CBC W/AUTO DIFF WBC: CPT | Performed by: EMERGENCY MEDICINE

## 2018-04-08 PROCEDURE — 93005 ELECTROCARDIOGRAM TRACING: CPT | Performed by: EMERGENCY MEDICINE

## 2018-04-08 PROCEDURE — 99284 EMERGENCY DEPT VISIT MOD MDM: CPT | Performed by: EMERGENCY MEDICINE

## 2018-04-08 RX ORDER — NITROGLYCERIN 0.4 MG/1
TABLET SUBLINGUAL
Qty: 1 BOTTLE | Refills: 0 | Status: ON HOLD | OUTPATIENT
Start: 2018-04-08 | End: 2018-10-12 | Stop reason: CLARIF

## 2018-04-08 RX ORDER — OMEPRAZOLE 20 MG/1
20 TABLET, DELAYED RELEASE ORAL DAILY
Qty: 31 TAB | Refills: 2 | Status: SHIPPED | OUTPATIENT
Start: 2018-04-08
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
## 2018-04-08 NOTE — DISCHARGE INSTRUCTIONS
Begin taking 2 baby aspirin every day    Take Prilosec daily  Use Tums or Rolaids as needed    If no relief with Tums or Rolaids, try the nitroglycerin  You may try 1 nitro under the tongue every 5 minutes (up to three in a row) as needed for chest pain       Chest Pain: Care Instructions  Your Care Instructions    There are many things that can cause chest pain. Some are not serious and will get better on their own in a few days. But some kinds of chest pain need more testing and treatment. Your doctor may have recommended a follow-up visit in the next 8 to 12 hours. If you are not getting better, you may need more tests or treatment. Even though your doctor has released you, you still need to watch for any problems. The doctor carefully checked you, but sometimes problems can develop later. If you have new symptoms or if your symptoms do not get better, get medical care right away. If you have worse or different chest pain or pressure that lasts more than 5 minutes or you passed out (lost consciousness), call 911 or seek other emergency help right away. A medical visit is only one step in your treatment. Even if you feel better, you still need to do what your doctor recommends, such as going to all suggested follow-up appointments and taking medicines exactly as directed. This will help you recover and help prevent future problems. How can you care for yourself at home? · Rest until you feel better. · Take your medicine exactly as prescribed. Call your doctor if you think you are having a problem with your medicine. · Do not drive after taking a prescription pain medicine. When should you call for help? Call 911 if:  ? · You passed out (lost consciousness). ? · You have severe difficulty breathing. ? · You have symptoms of a heart attack. These may include:  ¨ Chest pain or pressure, or a strange feeling in your chest.  ¨ Sweating. ¨ Shortness of breath. ¨ Nausea or vomiting.   ¨ Pain, pressure, or a strange feeling in your back, neck, jaw, or upper belly or in one or both shoulders or arms. ¨ Lightheadedness or sudden weakness. ¨ A fast or irregular heartbeat. After you call 911, the  may tell you to chew 1 adult-strength or 2 to 4 low-dose aspirin. Wait for an ambulance. Do not try to drive yourself. ?Call your doctor today if:  ? · You have any trouble breathing. ? · Your chest pain gets worse. ? · You are dizzy or lightheaded, or you feel like you may faint. ? · You are not getting better as expected. ? · You are having new or different chest pain. Where can you learn more? Go to http://deirdre-tonny.info/. Enter A120 in the search box to learn more about \"Chest Pain: Care Instructions. \"  Current as of: March 20, 2017  Content Version: 11.4  © 6077-1457 [employer]. Care instructions adapted under license by [employer] (which disclaims liability or warranty for this information). If you have questions about a medical condition or this instruction, always ask your healthcare professional. [place] [address] any warranty or liability for your use of this information. Gastroesophageal Reflux Disease (GERD): Care Instructions  Your Care Instructions    Gastroesophageal reflux disease (GERD) is the backward flow of stomach acid into the esophagus. The esophagus is the tube that leads from your throat to your stomach. A one-way valve prevents the stomach acid from moving up into this tube. When you have GERD, this valve does not close tightly enough. If you have mild GERD symptoms including heartburn, you may be able to control the problem with antacids or over-the-counter medicine. Changing your diet, losing weight, and making other lifestyle changes can also help reduce symptoms. Follow-up care is a key part of your treatment and safety.  Be sure to make and go to all appointments, and call your doctor if you are having problems. It's also a good idea to know your test results and keep a list of the medicines you take. How can you care for yourself at home? · Take your medicines exactly as prescribed. Call your doctor if you think you are having a problem with your medicine. · Your doctor may recommend over-the-counter medicine. For mild or occasional indigestion, antacids, such as Tums, Gaviscon, Mylanta, or Maalox, may help. Your doctor also may recommend over-the-counter acid reducers, such as Pepcid AC, Tagamet HB, Zantac 75, or Prilosec. Read and follow all instructions on the label. If you use these medicines often, talk with your doctor. · Change your eating habits. ¨ It's best to eat several small meals instead of two or three large meals. ¨ After you eat, wait 2 to 3 hours before you lie down. ¨ Chocolate, mint, and alcohol can make GERD worse. ¨ Spicy foods, foods that have a lot of acid (like tomatoes and oranges), and coffee can make GERD symptoms worse in some people. If your symptoms are worse after you eat a certain food, you may want to stop eating that food to see if your symptoms get better. · Do not smoke or chew tobacco. Smoking can make GERD worse. If you need help quitting, talk to your doctor about stop-smoking programs and medicines. These can increase your chances of quitting for good. · If you have GERD symptoms at night, raise the head of your bed 6 to 8 inches by putting the frame on blocks or placing a foam wedge under the head of your mattress. (Adding extra pillows does not work.)  · Do not wear tight clothing around your middle. · Lose weight if you need to. Losing just 5 to 10 pounds can help. When should you call for help? Call your doctor now or seek immediate medical care if:  ? · You have new or different belly pain. ? · Your stools are black and tarlike or have streaks of blood. ? Watch closely for changes in your health, and be sure to contact your doctor if:  ? · Your symptoms have not improved after 2 days. ? · Food seems to catch in your throat or chest.   Where can you learn more? Go to http://deirdre-tonny.info/. Enter E462 in the search box to learn more about \"Gastroesophageal Reflux Disease (GERD): Care Instructions. \"  Current as of: May 12, 2017  Content Version: 11.4  © 5731-1951 [employer]. Care instructions adapted under license by [employer] (which disclaims liability or warranty for this information). If you have questions about a medical condition or this instruction, always ask your healthcare professional. [place] [address] any warranty or liability for your use of this information.

## 2018-04-08 NOTE — ED NOTES
I have reviewed discharge instructions with the patient. The patient verbalized understanding. Patient left ED via Discharge Method: ambulatory to Home with niece. Opportunity for questions and clarification provided. Patient given 2 scripts. To continue your aftercare when you leave the hospital, you may receive an automated call from our care team to check in on how you are doing. This is a free service and part of our promise to provide the best care and service to meet your aftercare needs.  If you have questions, or wish to unsubscribe from this service please call [phone]. Thank you for Choosing our [facility] Emergency Department.

## 2018-04-08 NOTE — ED TRIAGE NOTES
Pt arrived with family. Pt states he thought his heart was going to stop earlier. Family states pt has hx of dementia. Pt states having sharp pains earlier.

## 2018-06-22 ENCOUNTER — APPOINTMENT (OUTPATIENT)
Dept: GENERAL RADIOLOGY | Age: 73
End: 2018-06-22
Attending: EMERGENCY MEDICINE
Payer: MEDICARE

## 2018-06-22 PROCEDURE — 85025 COMPLETE CBC W/AUTO DIFF WBC: CPT | Performed by: EMERGENCY MEDICINE

## 2018-06-22 PROCEDURE — 84484 ASSAY OF TROPONIN QUANT: CPT | Performed by: EMERGENCY MEDICINE

## 2018-06-22 PROCEDURE — 71046 X-RAY EXAM CHEST 2 VIEWS: CPT

## 2018-06-22 PROCEDURE — 99285 EMERGENCY DEPT VISIT HI MDM: CPT | Performed by: EMERGENCY MEDICINE

## 2018-06-22 PROCEDURE — 93005 ELECTROCARDIOGRAM TRACING: CPT | Performed by: EMERGENCY MEDICINE

## 2018-06-22 PROCEDURE — 80053 COMPREHEN METABOLIC PANEL: CPT | Performed by: EMERGENCY MEDICINE

## 2018-06-22 RX ORDER — SODIUM CHLORIDE 0.9 % (FLUSH) 0.9 %
5-10 SYRINGE (ML) INJECTION AS NEEDED
Status: DISCONTINUED | OUTPATIENT
Start: 2018-06-22 | End: 2018-06-23 | Stop reason: HOSPADM

## 2018-06-22 RX ORDER — SODIUM CHLORIDE 0.9 % (FLUSH) 0.9 %
5-10 SYRINGE (ML) INJECTION EVERY 8 HOURS
Status: DISCONTINUED | OUTPATIENT
Start: 2018-06-22 | End: 2018-06-23 | Stop reason: HOSPADM

## 2018-06-23 ENCOUNTER — HOSPITAL ENCOUNTER (EMERGENCY)
Age: 73
Discharge: HOME | End: 2018-06-23
Attending: EMERGENCY MEDICINE
Payer: MEDICARE

## 2018-06-23 VITALS
BODY MASS INDEX: 24.25 KG/M2 | RESPIRATION RATE: 16 BRPM | HEIGHT: 68 IN | TEMPERATURE: 98.3 F | HEART RATE: 62 BPM | SYSTOLIC BLOOD PRESSURE: 160 MMHG | OXYGEN SATURATION: 97 % | DIASTOLIC BLOOD PRESSURE: 90 MMHG | WEIGHT: 160 LBS

## 2018-06-23 DIAGNOSIS — R07.89 ATYPICAL CHEST PAIN: Primary | ICD-10-CM

## 2018-06-23 DIAGNOSIS — F03.90 DEMENTIA WITHOUT BEHAVIORAL DISTURBANCE, UNSPECIFIED DEMENTIA TYPE: ICD-10-CM

## 2018-06-23 LAB
ALBUMIN SERPL-MCNC: 3.5 G/DL (ref 3.2–4.6)
ALBUMIN/GLOB SERPL: 0.9 {RATIO} (ref 1.2–3.5)
ALP SERPL-CCNC: 73 U/L (ref 50–136)
ALT SERPL-CCNC: 29 U/L (ref 12–65)
ANION GAP SERPL CALC-SCNC: 9 MMOL/L (ref 7–16)
AST SERPL-CCNC: 23 U/L (ref 15–37)
ATRIAL RATE: 64 BPM
BASOPHILS # BLD: 0 K/UL (ref 0–0.2)
BASOPHILS NFR BLD: 0 % (ref 0–2)
BILIRUB SERPL-MCNC: 0.2 MG/DL (ref 0.2–1.1)
BUN SERPL-MCNC: 16 MG/DL (ref 8–23)
CALCIUM SERPL-MCNC: 8.7 MG/DL (ref 8.3–10.4)
CALCULATED P AXIS, ECG09: 47 DEGREES
CALCULATED R AXIS, ECG10: 19 DEGREES
CALCULATED T AXIS, ECG11: 35 DEGREES
CHLORIDE SERPL-SCNC: 110 MMOL/L (ref 98–107)
CO2 SERPL-SCNC: 25 MMOL/L (ref 21–32)
CREAT SERPL-MCNC: 1.54 MG/DL (ref 0.8–1.5)
DIAGNOSIS, 93000: NORMAL
DIFFERENTIAL METHOD BLD: ABNORMAL
EOSINOPHIL # BLD: 0.2 K/UL (ref 0–0.8)
EOSINOPHIL NFR BLD: 4 % (ref 0.5–7.8)
ERYTHROCYTE [DISTWIDTH] IN BLOOD BY AUTOMATED COUNT: 13.8 % (ref 11.9–14.6)
GLOBULIN SER CALC-MCNC: 4 G/DL (ref 2.3–3.5)
GLUCOSE SERPL-MCNC: 89 MG/DL (ref 65–100)
HCT VFR BLD AUTO: 38.9 % (ref 41.1–50.3)
HGB BLD-MCNC: 13.3 G/DL (ref 13.6–17.2)
IMM GRANULOCYTES # BLD: 0 K/UL (ref 0–0.5)
IMM GRANULOCYTES NFR BLD AUTO: 0 % (ref 0–5)
LYMPHOCYTES # BLD: 2.1 K/UL (ref 0.5–4.6)
LYMPHOCYTES NFR BLD: 45 % (ref 13–44)
MCH RBC QN AUTO: 31.4 PG (ref 26.1–32.9)
MCHC RBC AUTO-ENTMCNC: 34.2 G/DL (ref 31.4–35)
MCV RBC AUTO: 92 FL (ref 79.6–97.8)
MONOCYTES # BLD: 0.3 K/UL (ref 0.1–1.3)
MONOCYTES NFR BLD: 7 % (ref 4–12)
NEUTS SEG # BLD: 2 K/UL (ref 1.7–8.2)
NEUTS SEG NFR BLD: 44 % (ref 43–78)
P-R INTERVAL, ECG05: 158 MS
PLATELET # BLD AUTO: 170 K/UL (ref 150–450)
PMV BLD AUTO: 11.7 FL (ref 10.8–14.1)
POTASSIUM SERPL-SCNC: 3.9 MMOL/L (ref 3.5–5.1)
PROT SERPL-MCNC: 7.5 G/DL (ref 6.3–8.2)
Q-T INTERVAL, ECG07: 406 MS
QRS DURATION, ECG06: 80 MS
QTC CALCULATION (BEZET), ECG08: 418 MS
RBC # BLD AUTO: 4.23 M/UL (ref 4.23–5.67)
SODIUM SERPL-SCNC: 144 MMOL/L (ref 136–145)
TROPONIN I SERPL-MCNC: <0.02 NG/ML (ref 0.02–0.05)
VENTRICULAR RATE, ECG03: 64 BPM
WBC # BLD AUTO: 4.7 K/UL (ref 4.3–11.1)
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
## 2018-06-23 NOTE — ED TRIAGE NOTES
Pt c/o epigastric pain x 3 days. Pt reports nausea with no vomiting. Pt also reports SOB and headache. Pt denies any diarrhea. Pt reports he came to the ED yesterday, but left before being seen by a physician \"because I had two young children with me and I couldn't wait. \" Pt is unreliable historian as he answers the same question differently each time he is asked, despite clarification of the question.

## 2018-06-23 NOTE — DISCHARGE INSTRUCTIONS
Chest Pain: Care Instructions  Your Care Instructions    There are many things that can cause chest pain. Some are not serious and will get better on their own in a few days. But some kinds of chest pain need more testing and treatment. Your doctor may have recommended a follow-up visit in the next 8 to 12 hours. If you are not getting better, you may need more tests or treatment. Even though your doctor has released you, you still need to watch for any problems. The doctor carefully checked you, but sometimes problems can develop later. If you have new symptoms or if your symptoms do not get better, get medical care right away. If you have worse or different chest pain or pressure that lasts more than 5 minutes or you passed out (lost consciousness), call 911 or seek other emergency help right away. A medical visit is only one step in your treatment. Even if you feel better, you still need to do what your doctor recommends, such as going to all suggested follow-up appointments and taking medicines exactly as directed. This will help you recover and help prevent future problems. How can you care for yourself at home? · Rest until you feel better. · Take your medicine exactly as prescribed. Call your doctor if you think you are having a problem with your medicine. · Do not drive after taking a prescription pain medicine. When should you call for help? Call 911 if:  ? · You passed out (lost consciousness). ? · You have severe difficulty breathing. ? · You have symptoms of a heart attack. These may include:  ¨ Chest pain or pressure, or a strange feeling in your chest.  ¨ Sweating. ¨ Shortness of breath. ¨ Nausea or vomiting. ¨ Pain, pressure, or a strange feeling in your back, neck, jaw, or upper belly or in one or both shoulders or arms. ¨ Lightheadedness or sudden weakness. ¨ A fast or irregular heartbeat.   After you call 911, the  may tell you to chew 1 adult-strength or 2 to 4 low-dose aspirin. Wait for an ambulance. Do not try to drive yourself. ?Call your doctor today if:  ? · You have any trouble breathing. ? · Your chest pain gets worse. ? · You are dizzy or lightheaded, or you feel like you may faint. ? · You are not getting better as expected. ? · You are having new or different chest pain. Where can you learn more? Go to http://deirdre-tonny.info/. Enter A120 in the search box to learn more about \"Chest Pain: Care Instructions. \"  Current as of: March 20, 2017  Content Version: 11.4  © 7480-4617 [employer]. Care instructions adapted under license by [employer] (which disclaims liability or warranty for this information). If you have questions about a medical condition or this instruction, always ask your healthcare professional. [address] any warranty or liability for your use of this information.

## 2018-06-23 NOTE — ED PROVIDER NOTES
Patient is a 67 y.o. male presenting with chest pain. The history is provided by the patient and the EMS personnel. The history is limited by the condition of the patient. Chest Pain    This is a new problem. Episode onset: unsure. The problem has been resolved. Episode Length: unsure. The problem occurs rarely. The pain is associated with normal activity. The patient is experiencing no pain. The pain does not radiate. Exacerbated by: nothing. Pertinent negatives include no diaphoresis, no headaches, no leg pain, no lower extremity edema, no nausea, no sputum production and no vomiting. He has tried nothing for the symptoms. The treatment provided significant relief. Risk factors include male gender and hypertension. His past medical history is significant for HTN. His past medical history does not include aneurysm, DM, DVT, PE or CHF. Past Medical History:   Diagnosis Date    CAD (coronary artery disease)     Chronic back pain     Hypertension     Stroke ([facility] [address]     w/ L side effected       Past Surgical History:   Procedure Laterality Date    HX HEENT      repair to L surrounding eye tissues         No family history on file. Social History     Social History    Marital status:      Spouse name: N/A    Number of children: N/A    Years of education: N/A     Occupational History    Not on file. Social History Main Topics    Smoking status: Former Smoker     Packs/day: 0.25     Years: 60.00     Quit date: 1/16/2017    Smokeless tobacco: Never Used    Alcohol use No      Comment: occsaional    Drug use: No    Sexual activity: Not on file     Other Topics Concern    Not on file     Social History Narrative    ** Merged History Encounter **              ALLERGIES: Pcn [penicillins] and Pcn [penicillins]    Review of Systems   Unable to perform ROS: Dementia   Constitutional: Negative for diaphoresis. Respiratory: Negative for sputum production.     Cardiovascular: Positive for chest pain.   Gastrointestinal: Negative for nausea and vomiting. Neurological: Negative for headaches. Vitals:    06/22/18 2316 06/23/18 0103 06/23/18 0123 06/23/18 0143   BP: (!) 132/91 173/90 (!) 140/91 147/90   Pulse: 65 61 (!) 59 60   Resp: 18      Temp: 98.3 °F (36.8 °C)      SpO2: 99% 99% 97% 98%   Weight: 72.6 kg (160 lb)      Height: 5' 8\" (1.727 m)               Physical Exam   Constitutional: He appears well-developed and well-nourished. No distress. HENT:   Head: Normocephalic and atraumatic. Right Ear: Tympanic membrane and external ear normal.   Left Ear: Tympanic membrane and external ear normal.   Mouth/Throat: Oropharynx is clear and moist.   Eyes: Conjunctivae and EOM are normal. Pupils are equal, round, and reactive to light. Neck: Normal range of motion. Neck supple. No tracheal deviation present. Cardiovascular: Normal rate, regular rhythm, normal heart sounds and intact distal pulses. Exam reveals no gallop and no friction rub. No murmur heard. Pulmonary/Chest: Effort normal and breath sounds normal. No respiratory distress. He has no wheezes. Abdominal: Soft. Bowel sounds are normal. He exhibits no distension and no mass. There is no hepatosplenomegaly. There is no tenderness. There is no rebound and no guarding. Musculoskeletal: Normal range of motion. He exhibits no edema. Lymphadenopathy:     He has no cervical adenopathy. Neurological: He is alert. He has normal strength. He is disoriented. He displays normal reflexes. No cranial nerve deficit or sensory deficit. Skin: Skin is warm and dry. No rash noted. He is not diaphoretic. No erythema. Psychiatric: He has a normal mood and affect. His speech is normal and behavior is normal. Cognition and memory are impaired. Nursing note and vitals reviewed.        MDM  Number of Diagnoses or Management Options  Atypical chest pain: new and requires workup  Dementia without behavioral disturbance, unspecified dementia type: established and worsening     Amount and/or Complexity of Data Reviewed  Clinical lab tests: ordered and reviewed  Tests in the radiology section of CPT®: ordered and reviewed  Review and summarize past medical records: yes  Independent visualization of images, tracings, or specimens: yes    Risk of Complications, Morbidity, and/or Mortality  Presenting problems: high  Diagnostic procedures: moderate  Management options: moderate    Patient Progress  Patient progress: stable        ED Course       Procedures    The patient was observed in the ED. At the time of my evaluation, the patient's only complaint was right foot pain. Examining his shoes it was noted they were on backwards and the tongue to the right shoe  Was rolled underneath,  Giving undue pressure to 1 section of the patient's foot  And both for very tightly tied. Patient's shoes were removed readjusted and placed on the proper feet and the patient was feeling much better. Results Reviewed:      Recent Results (from the past 24 hour(s))   CBC WITH AUTOMATED DIFF    Collection Time: 06/22/18 11:38 PM   Result Value Ref Range    WBC 4.7 4.3 - 11.1 K/uL    RBC 4.23 4.23 - 5.67 M/uL    HGB 13.3 (L) 13.6 - 17.2 g/dL    HCT 38.9 (L) 41.1 - 50.3 %    MCV 92.0 79.6 - 97.8 FL    MCH 31.4 26.1 - 32.9 PG    MCHC 34.2 31.4 - 35.0 g/dL    RDW 13.8 11.9 - 14.6 %    PLATELET 303 682 - 273 K/uL    MPV 11.7 10.8 - 14.1 FL    DF AUTOMATED      NEUTROPHILS 44 43 - 78 %    LYMPHOCYTES 45 (H) 13 - 44 %    MONOCYTES 7 4.0 - 12.0 %    EOSINOPHILS 4 0.5 - 7.8 %    BASOPHILS 0 0.0 - 2.0 %    IMMATURE GRANULOCYTES 0 0.0 - 5.0 %    ABS. NEUTROPHILS 2.0 1.7 - 8.2 K/UL    ABS. LYMPHOCYTES 2.1 0.5 - 4.6 K/UL    ABS. MONOCYTES 0.3 0.1 - 1.3 K/UL    ABS. EOSINOPHILS 0.2 0.0 - 0.8 K/UL    ABS. BASOPHILS 0.0 0.0 - 0.2 K/UL    ABS. IMM.  GRANS. 0.0 0.0 - 0.5 K/UL   METABOLIC PANEL, COMPREHENSIVE    Collection Time: 06/22/18 11:38 PM   Result Value Ref Range    Sodium 144 136 - 145 mmol/L    Potassium 3.9 3.5 - 5.1 mmol/L    Chloride 110 (H) 98 - 107 mmol/L    CO2 25 21 - 32 mmol/L    Anion gap 9 7 - 16 mmol/L    Glucose 89 65 - 100 mg/dL    BUN 16 8 - 23 MG/DL    Creatinine 1.54 (H) 0.8 - 1.5 MG/DL    GFR est AA 57 (L) >60 ml/min/1.73m2    GFR est non-AA 47 (L) >60 ml/min/1.73m2    Calcium 8.7 8.3 - 10.4 MG/DL    Bilirubin, total 0.2 0.2 - 1.1 MG/DL    ALT (SGPT) 29 12 - 65 U/L    AST (SGOT) 23 15 - 37 U/L    Alk. phosphatase 73 50 - 136 U/L    Protein, total 7.5 6.3 - 8.2 g/dL    Albumin 3.5 3.2 - 4.6 g/dL    Globulin 4.0 (H) 2.3 - 3.5 g/dL    A-G Ratio 0.9 (L) 1.2 - 3.5     TROPONIN I    Collection Time: 06/22/18 11:38 PM   Result Value Ref Range    Troponin-I, Qt. <0.02 (L) 0.02 - 0.05 NG/ML     XR CHEST PA LAT   Final Result   IMPRESSION:      Negative chest x-ray. I discussed the results of all labs, procedures, radiographs, and treatments with the patient and available family. Treatment plan is agreed upon and the patient is ready for discharge. All voiced understanding of the discharge plan and medication instructions or changes as appropriate. Questions about treatment in the ED were answered. All were encouraged to return should symptoms worsen or new problems develop.

## 2018-07-17 ENCOUNTER — HOSPITAL ENCOUNTER (EMERGENCY)
Age: 73
Discharge: HOME | End: 2018-07-18
Attending: EMERGENCY MEDICINE
Payer: MEDICARE

## 2018-07-17 DIAGNOSIS — M54.40 CHRONIC MIDLINE LOW BACK PAIN WITH SCIATICA, SCIATICA LATERALITY UNSPECIFIED: ICD-10-CM

## 2018-07-17 DIAGNOSIS — G89.29 CHRONIC MIDLINE LOW BACK PAIN WITH SCIATICA, SCIATICA LATERALITY UNSPECIFIED: ICD-10-CM

## 2018-07-17 DIAGNOSIS — R07.89 ATYPICAL CHEST PAIN: Primary | ICD-10-CM

## 2018-07-17 LAB
ALBUMIN SERPL-MCNC: 3.5 G/DL (ref 3.2–4.6)
ALBUMIN/GLOB SERPL: 0.9 {RATIO} (ref 1.2–3.5)
ALP SERPL-CCNC: 74 U/L (ref 50–136)
ALT SERPL-CCNC: 34 U/L (ref 12–65)
ANION GAP SERPL CALC-SCNC: 8 MMOL/L (ref 7–16)
AST SERPL-CCNC: 25 U/L (ref 15–37)
BASOPHILS # BLD: 0 K/UL (ref 0–0.2)
BASOPHILS NFR BLD: 0 % (ref 0–2)
BILIRUB SERPL-MCNC: 0.2 MG/DL (ref 0.2–1.1)
BUN SERPL-MCNC: 14 MG/DL (ref 8–23)
CALCIUM SERPL-MCNC: 8.8 MG/DL (ref 8.3–10.4)
CHLORIDE SERPL-SCNC: 111 MMOL/L (ref 98–107)
CO2 SERPL-SCNC: 26 MMOL/L (ref 21–32)
CREAT SERPL-MCNC: 1.69 MG/DL (ref 0.8–1.5)
DIFFERENTIAL METHOD BLD: ABNORMAL
EOSINOPHIL # BLD: 0.2 K/UL (ref 0–0.8)
EOSINOPHIL NFR BLD: 4 % (ref 0.5–7.8)
ERYTHROCYTE [DISTWIDTH] IN BLOOD BY AUTOMATED COUNT: 13.8 % (ref 11.9–14.6)
GLOBULIN SER CALC-MCNC: 4.1 G/DL (ref 2.3–3.5)
GLUCOSE SERPL-MCNC: 96 MG/DL (ref 65–100)
HCT VFR BLD AUTO: 39.5 % (ref 41.1–50.3)
HGB BLD-MCNC: 13.5 G/DL (ref 13.6–17.2)
IMM GRANULOCYTES # BLD: 0 K/UL (ref 0–0.5)
IMM GRANULOCYTES NFR BLD AUTO: 0 % (ref 0–5)
LYMPHOCYTES # BLD: 2.4 K/UL (ref 0.5–4.6)
LYMPHOCYTES NFR BLD: 49 % (ref 13–44)
MCH RBC QN AUTO: 31.6 PG (ref 26.1–32.9)
MCHC RBC AUTO-ENTMCNC: 34.2 G/DL (ref 31.4–35)
MCV RBC AUTO: 92.5 FL (ref 79.6–97.8)
MONOCYTES # BLD: 0.4 K/UL (ref 0.1–1.3)
MONOCYTES NFR BLD: 7 % (ref 4–12)
NEUTS SEG # BLD: 2 K/UL (ref 1.7–8.2)
NEUTS SEG NFR BLD: 40 % (ref 43–78)
PLATELET # BLD AUTO: 171 K/UL (ref 150–450)
PMV BLD AUTO: 12 FL (ref 10.8–14.1)
POTASSIUM SERPL-SCNC: 4 MMOL/L (ref 3.5–5.1)
PROT SERPL-MCNC: 7.6 G/DL (ref 6.3–8.2)
RBC # BLD AUTO: 4.27 M/UL (ref 4.23–5.67)
SODIUM SERPL-SCNC: 145 MMOL/L (ref 136–145)
WBC # BLD AUTO: 5 K/UL (ref 4.3–11.1)

## 2018-07-17 PROCEDURE — 81003 URINALYSIS AUTO W/O SCOPE: CPT | Performed by: EMERGENCY MEDICINE

## 2018-07-17 PROCEDURE — 93005 ELECTROCARDIOGRAM TRACING: CPT | Performed by: EMERGENCY MEDICINE

## 2018-07-17 PROCEDURE — 80053 COMPREHEN METABOLIC PANEL: CPT | Performed by: EMERGENCY MEDICINE

## 2018-07-17 PROCEDURE — 85025 COMPLETE CBC W/AUTO DIFF WBC: CPT | Performed by: EMERGENCY MEDICINE

## 2018-07-17 PROCEDURE — 99284 EMERGENCY DEPT VISIT MOD MDM: CPT | Performed by: EMERGENCY MEDICINE

## 2018-07-18 ENCOUNTER — APPOINTMENT (OUTPATIENT)
Dept: GENERAL RADIOLOGY | Age: 73
End: 2018-07-18
Attending: EMERGENCY MEDICINE
Payer: MEDICARE

## 2018-07-18 VITALS
BODY MASS INDEX: 25.76 KG/M2 | RESPIRATION RATE: 16 BRPM | DIASTOLIC BLOOD PRESSURE: 98 MMHG | SYSTOLIC BLOOD PRESSURE: 154 MMHG | OXYGEN SATURATION: 100 % | TEMPERATURE: 98 F | WEIGHT: 170 LBS | HEART RATE: 76 BPM | HEIGHT: 68 IN

## 2018-07-18 LAB
ATRIAL RATE: 74 BPM
CALCULATED P AXIS, ECG09: 55 DEGREES
CALCULATED R AXIS, ECG10: 17 DEGREES
CALCULATED T AXIS, ECG11: 35 DEGREES
DIAGNOSIS, 93000: NORMAL
P-R INTERVAL, ECG05: 144 MS
Q-T INTERVAL, ECG07: 398 MS
QRS DURATION, ECG06: 88 MS
QTC CALCULATION (BEZET), ECG08: 441 MS
TROPONIN I BLD-MCNC: 0.01 NG/ML (ref 0.02–0.05)
VENTRICULAR RATE, ECG03: 74 BPM

## 2018-07-18 PROCEDURE — 71045 X-RAY EXAM CHEST 1 VIEW: CPT

## 2018-07-18 PROCEDURE — 74011250637 HC RX REV CODE- 250/637: Performed by: EMERGENCY MEDICINE

## 2018-07-18 PROCEDURE — 84484 ASSAY OF TROPONIN QUANT: CPT

## 2018-07-18 RX ORDER — SUCRALFATE 1 G/10ML
1 SUSPENSION ORAL
Status: COMPLETED | OUTPATIENT
Start: 2018-07-18 | End: 2018-07-18

## 2018-07-18 RX ORDER — SUCRALFATE 1 G/10ML
SUSPENSION ORAL
Status: DISCONTINUED
Start: 2018-07-18 | End: 2018-07-18 | Stop reason: HOSPADM

## 2018-07-18 RX ORDER — ACETAMINOPHEN 325 MG/1
650 TABLET ORAL
Status: COMPLETED | OUTPATIENT
Start: 2018-07-18 | End: 2018-07-18

## 2018-07-18 RX ADMIN — SUCRALFATE 1 G: 1 SUSPENSION ORAL at 02:48

## 2018-07-18 RX ADMIN — ACETAMINOPHEN 650 MG: 325 TABLET ORAL at 01:00

## 2018-07-18 NOTE — DISCHARGE INSTRUCTIONS
Tylenol, 325-650 mg every 6 hours for pain. A try heating pad or hot water bottle. Call his primary care doctor to recheck. They made elect to do a stress test or further testing for his heart. Recheck sooner for worse pain, fever rash or vomiting. Learning About Relief for Back Pain  What is back tension and strain? Back strain happens when you overstretch, or pull, a muscle in your back. You may hurt your back in an accident or when you exercise or lift something. Most back pain will get better with rest and time. You can take care of yourself at home to help your back heal.  What can you do first to relieve back pain? When you first feel back pain, try these steps:  · Walk. Take a short walk (10 to 20 minutes) on a level surface (no slopes, hills, or stairs) every 2 to 3 hours. Walk only distances you can manage without pain, especially leg pain. · Relax. Find a comfortable position for rest. Some people are comfortable on the floor or a medium-firm bed with a small pillow under their head and another under their knees. Some people prefer to lie on their side with a pillow between their knees. Don't stay in one position for too long. · Try heat or ice. Try using a heating pad on a low or medium setting, or take a warm shower, for 15 to 20 minutes every 2 to 3 hours. Or you can buy single-use heat wraps that last up to 8 hours. You can also try an ice pack for 10 to 15 minutes every 2 to 3 hours. You can use an ice pack or a bag of frozen vegetables wrapped in a thin towel. There is not strong evidence that either heat or ice will help, but you can try them to see if they help. You may also want to try switching between heat and cold. · Take pain medicine exactly as directed. ¨ If the doctor gave you a prescription medicine for pain, take it as prescribed. ¨ If you are not taking a prescription pain medicine, ask your doctor if you can take an over-the-counter medicine.   What else can you do?  · Stretch and exercise. Exercises that increase flexibility may relieve your pain and make it easier for your muscles to keep your spine in a good, neutral position. And don't forget to keep walking. · Do self-massage. You can use self-massage to unwind after work or school or to energize yourself in the morning. You can easily massage your feet, hands, or neck. Self-massage works best if you are in comfortable clothes and are sitting or lying in a comfortable position. Use oil or lotion to massage bare skin. · Reduce stress. Back pain can lead to a vicious [place]: Distress about the pain tenses the muscles in your back, which in turn causes more pain. Learn how to relax your mind and your muscles to lower your stress. Where can you learn more? Go to http://deirdre-tonny.info/. Enter V800 in the search box to learn more about \"Learning About Relief for Back Pain. \"  Current as of: March 21, 2017  Content Version: 11.5  © 1032-0523 [employer]. Care instructions adapted under license by [employer] (which disclaims liability or warranty for this information). If you have questions about a medical condition or this instruction, always ask your healthcare professional. [address] any warranty or liability for your use of this information. Chest Pain: Care Instructions  Your Care Instructions    There are many things that can cause chest pain. Some are not serious and will get better on their own in a few days. But some kinds of chest pain need more testing and treatment. Your doctor may have recommended a follow-up visit in the next 8 to 12 hours. If you are not getting better, you may need more tests or treatment. Even though your doctor has released you, you still need to watch for any problems. The doctor carefully checked you, but sometimes problems can develop later.  If you have new symptoms or if your symptoms do not get better, get medical care right away. If you have worse or different chest pain or pressure that lasts more than 5 minutes or you passed out (lost consciousness), call 911 or seek other emergency help right away. A medical visit is only one step in your treatment. Even if you feel better, you still need to do what your doctor recommends, such as going to all suggested follow-up appointments and taking medicines exactly as directed. This will help you recover and help prevent future problems. How can you care for yourself at home? · Rest until you feel better. · Take your medicine exactly as prescribed. Call your doctor if you think you are having a problem with your medicine. · Do not drive after taking a prescription pain medicine. When should you call for help? Call 911 if:    · You passed out (lost consciousness).     · You have severe difficulty breathing.     · You have symptoms of a heart attack. These may include:  ¨ Chest pain or pressure, or a strange feeling in your chest.  ¨ Sweating. ¨ Shortness of breath. ¨ Nausea or vomiting. ¨ Pain, pressure, or a strange feeling in your back, neck, jaw, or upper belly or in one or both shoulders or arms. ¨ Lightheadedness or sudden weakness. ¨ A fast or irregular heartbeat. After you call 911, the  may tell you to chew 1 adult-strength or 2 to 4 low-dose aspirin. Wait for an ambulance. Do not try to drive yourself.    Call your doctor today if:    · You have any trouble breathing.     · Your chest pain gets worse.     · You are dizzy or lightheaded, or you feel like you may faint.     · You are not getting better as expected.     · You are having new or different chest pain. Where can you learn more? Go to http://deirdre-tonny.info/. Enter A120 in the search box to learn more about \"Chest Pain: Care Instructions. \"  Current as of: November 20, 2017  Content Version: 11.7  © 6568-5375 [employer].  Care instructions adapted under license by [employer] (which disclaims liability or warranty for this information). If you have questions about a medical condition or this instruction, always ask your healthcare professional. [address] any warranty or liability for your use of this information.

## 2018-07-18 NOTE — ED NOTES
States weakness in lower back and legs x 2 months. Arrived ambulating with rolling walker. Pt states he was seen in [facility] for same and told nothing was wrong.

## 2018-07-18 NOTE — ED TRIAGE NOTES
Pt states he keeps losing his balance when walking and has fallen 2 times. Episodes started tonight about 1.5 hours ago. Denies hx of strokes. Does have a hx of MIs. Family member states pt is not on blood thinners. HTN in triage.

## 2018-10-12 ENCOUNTER — APPOINTMENT (OUTPATIENT)
Dept: CT IMAGING | Age: 73
DRG: 565 | End: 2018-10-12
Attending: EMERGENCY MEDICINE
Payer: MEDICARE

## 2018-10-12 ENCOUNTER — HOSPITAL ENCOUNTER (INPATIENT)
Age: 73
LOS: 4 days | Discharge: NURSING, REHAB OR LONG-TERM CARE | DRG: 565 | End: 2018-10-17
Attending: EMERGENCY MEDICINE | Admitting: INTERNAL MEDICINE
Payer: MEDICARE

## 2018-10-12 ENCOUNTER — APPOINTMENT (OUTPATIENT)
Dept: GENERAL RADIOLOGY | Age: 73
DRG: 565 | End: 2018-10-12
Attending: EMERGENCY MEDICINE
Payer: MEDICARE

## 2018-10-12 DIAGNOSIS — S93.402A SPRAIN OF LEFT ANKLE, UNSPECIFIED LIGAMENT, INITIAL ENCOUNTER: ICD-10-CM

## 2018-10-12 DIAGNOSIS — F03.90 DEMENTIA WITHOUT BEHAVIORAL DISTURBANCE, UNSPECIFIED DEMENTIA TYPE: ICD-10-CM
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
## 2018-10-12 DIAGNOSIS — T79.6XXA TRAUMATIC RHABDOMYOLYSIS, INITIAL ENCOUNTER (HCC): ICD-10-CM

## 2018-10-12 DIAGNOSIS — S70.02XA CONTUSION OF LEFT HIP, INITIAL ENCOUNTER: ICD-10-CM

## 2018-10-12 PROBLEM — R29.6 RECURRENT FALLS: Status: ACTIVE | Noted: 2018-10-12

## 2018-10-12 LAB
ALBUMIN SERPL-MCNC: 3.5 G/DL (ref 3.2–4.6)
ALBUMIN/GLOB SERPL: 0.9 {RATIO} (ref 1.2–3.5)
ALP SERPL-CCNC: 68 U/L (ref 50–136)
ALT SERPL-CCNC: 36 U/L (ref 12–65)
ANION GAP SERPL CALC-SCNC: 8 MMOL/L (ref 7–16)
APPEARANCE UR: CLEAR
AST SERPL-CCNC: 63 U/L (ref 15–37)
ATRIAL RATE: 72 BPM
BASOPHILS # BLD: 0 K/UL (ref 0–0.2)
BASOPHILS NFR BLD: 0 % (ref 0–2)
BILIRUB SERPL-MCNC: 0.4 MG/DL (ref 0.2–1.1)
BILIRUB UR QL: NEGATIVE
BUN SERPL-MCNC: 13 MG/DL (ref 8–23)
CALCIUM SERPL-MCNC: 8.8 MG/DL (ref 8.3–10.4)
CALCULATED P AXIS, ECG09: 78 DEGREES
CALCULATED R AXIS, ECG10: 3 DEGREES
CALCULATED T AXIS, ECG11: 26 DEGREES
CHLORIDE SERPL-SCNC: 109 MMOL/L (ref 98–107)
CK SERPL-CCNC: 3078 U/L (ref 21–215)
CO2 SERPL-SCNC: 26 MMOL/L (ref 21–32)
COLOR UR: YELLOW
CREAT SERPL-MCNC: 1.52 MG/DL (ref 0.8–1.5)
DIAGNOSIS, 93000: NORMAL
DIFFERENTIAL METHOD BLD: ABNORMAL
EOSINOPHIL # BLD: 0 K/UL (ref 0–0.8)
EOSINOPHIL NFR BLD: 0 % (ref 0.5–7.8)
ERYTHROCYTE [DISTWIDTH] IN BLOOD BY AUTOMATED COUNT: 13 %
GLOBULIN SER CALC-MCNC: 3.8 G/DL (ref 2.3–3.5)
GLUCOSE BLD STRIP.AUTO-MCNC: 101 MG/DL (ref 65–100)
GLUCOSE BLD STRIP.AUTO-MCNC: 82 MG/DL (ref 65–100)
GLUCOSE SERPL-MCNC: 93 MG/DL (ref 65–100)
GLUCOSE UR STRIP.AUTO-MCNC: NEGATIVE MG/DL
HCT VFR BLD AUTO: 38.3 % (ref 41.1–50.3)
HGB BLD-MCNC: 12.8 G/DL (ref 13.6–17.2)
HGB UR QL STRIP: NEGATIVE
IMM GRANULOCYTES # BLD: 0 K/UL (ref 0–0.5)
IMM GRANULOCYTES NFR BLD AUTO: 0 % (ref 0–5)
KETONES UR QL STRIP.AUTO: NEGATIVE MG/DL
LEUKOCYTE ESTERASE UR QL STRIP.AUTO: NEGATIVE
LYMPHOCYTES # BLD: 1.5 K/UL (ref 0.5–4.6)
LYMPHOCYTES NFR BLD: 23 % (ref 13–44)
MCH RBC QN AUTO: 30.7 PG (ref 26.1–32.9)
MCHC RBC AUTO-ENTMCNC: 33.4 G/DL (ref 31.4–35)
MCV RBC AUTO: 91.8 FL (ref 79.6–97.8)
MONOCYTES # BLD: 0.7 K/UL (ref 0.1–1.3)
MONOCYTES NFR BLD: 10 % (ref 4–12)
NEUTS SEG # BLD: 4.5 K/UL (ref 1.7–8.2)
NEUTS SEG NFR BLD: 67 % (ref 43–78)
NITRITE UR QL STRIP.AUTO: NEGATIVE
NRBC # BLD: 0 K/UL (ref 0–0.2)
P-R INTERVAL, ECG05: 168 MS
PH UR STRIP: 7 [PH] (ref 5–9)
PLATELET # BLD AUTO: 175 K/UL (ref 150–450)
PMV BLD AUTO: 11 FL (ref 9.4–12.3)
POTASSIUM SERPL-SCNC: 3.8 MMOL/L (ref 3.5–5.1)
PROT SERPL-MCNC: 7.3 G/DL (ref 6.3–8.2)
PROT UR STRIP-MCNC: NEGATIVE MG/DL
Q-T INTERVAL, ECG07: 372 MS
QRS DURATION, ECG06: 82 MS
QTC CALCULATION (BEZET), ECG08: 407 MS
RBC # BLD AUTO: 4.17 M/UL (ref 4.23–5.6)
SODIUM SERPL-SCNC: 143 MMOL/L (ref 136–145)
SP GR UR REFRACTOMETRY: 1.02 (ref 1–1.02)
UROBILINOGEN UR QL STRIP.AUTO: 1 EU/DL (ref 0.2–1)
VENTRICULAR RATE, ECG03: 72 BPM
WBC # BLD AUTO: 6.7 K/UL (ref 4.3–11.1)

## 2018-10-12 PROCEDURE — 93005 ELECTROCARDIOGRAM TRACING: CPT | Performed by: EMERGENCY MEDICINE

## 2018-10-12 PROCEDURE — 73610 X-RAY EXAM OF ANKLE: CPT

## 2018-10-12 PROCEDURE — 74011250637 HC RX REV CODE- 250/637: Performed by: INTERNAL MEDICINE

## 2018-10-12 PROCEDURE — 99218 HC RM OBSERVATION: CPT

## 2018-10-12 PROCEDURE — 81003 URINALYSIS AUTO W/O SCOPE: CPT

## 2018-10-12 PROCEDURE — 74011250636 HC RX REV CODE- 250/636: Performed by: EMERGENCY MEDICINE

## 2018-10-12 PROCEDURE — 86580 TB INTRADERMAL TEST: CPT | Performed by: EMERGENCY MEDICINE

## 2018-10-12 PROCEDURE — 99285 EMERGENCY DEPT VISIT HI MDM: CPT | Performed by: EMERGENCY MEDICINE

## 2018-10-12 PROCEDURE — 74011250636 HC RX REV CODE- 250/636: Performed by: INTERNAL MEDICINE

## 2018-10-12 PROCEDURE — 82962 GLUCOSE BLOOD TEST: CPT

## 2018-10-12 PROCEDURE — 77030020263 HC SOL INJ SOD CL0.9% LFCR 1000ML

## 2018-10-12 PROCEDURE — 74011000302 HC RX REV CODE- 302: Performed by: EMERGENCY MEDICINE

## 2018-10-12 PROCEDURE — 81003 URINALYSIS AUTO W/O SCOPE: CPT | Performed by: EMERGENCY MEDICINE

## 2018-10-12 PROCEDURE — 85025 COMPLETE CBC W/AUTO DIFF WBC: CPT

## 2018-10-12 PROCEDURE — 73502 X-RAY EXAM HIP UNI 2-3 VIEWS: CPT

## 2018-10-12 PROCEDURE — 82550 ASSAY OF CK (CPK): CPT

## 2018-10-12 PROCEDURE — 80053 COMPREHEN METABOLIC PANEL: CPT

## 2018-10-12 PROCEDURE — 96360 HYDRATION IV INFUSION INIT: CPT | Performed by: EMERGENCY MEDICINE

## 2018-10-12 PROCEDURE — 70450 CT HEAD/BRAIN W/O DYE: CPT

## 2018-10-12 RX ORDER — SODIUM CHLORIDE 0.9 % (FLUSH) 0.9 %
5-10 SYRINGE (ML) INJECTION AS NEEDED
Status: DISCONTINUED | OUTPATIENT
Start: 2018-10-12 | End: 2018-10-17 | Stop reason: HOSPADM

## 2018-10-12 RX ORDER — LISINOPRIL AND HYDROCHLOROTHIAZIDE 12.5; 2 MG/1; MG/1
1 TABLET ORAL DAILY
Status: DISCONTINUED | OUTPATIENT
Start: 2018-10-13 | End: 2018-10-12

## 2018-10-12 RX ORDER — RISPERIDONE 2 MG/1
2 TABLET, FILM COATED ORAL
COMMUNITY
Start: 2018-10-01 | End: 2018-10-17

## 2018-10-12 RX ORDER — ONDANSETRON 2 MG/ML
4 INJECTION INTRAMUSCULAR; INTRAVENOUS
Status: DISCONTINUED | OUTPATIENT
Start: 2018-10-12 | End: 2018-10-17 | Stop reason: HOSPADM

## 2018-10-12 RX ORDER — DONEPEZIL HYDROCHLORIDE 5 MG/1
10 TABLET, FILM COATED ORAL DAILY
Status: DISCONTINUED | OUTPATIENT
Start: 2018-10-13 | End: 2018-10-17 | Stop reason: HOSPADM

## 2018-10-12 RX ORDER — SODIUM CHLORIDE 0.9 % (FLUSH) 0.9 %
5-10 SYRINGE (ML) INJECTION EVERY 8 HOURS
Status: DISCONTINUED | OUTPATIENT
Start: 2018-10-12 | End: 2018-10-17 | Stop reason: HOSPADM

## 2018-10-12 RX ORDER — RISPERIDONE 1 MG/1
2 TABLET, FILM COATED ORAL
Status: DISCONTINUED | OUTPATIENT
Start: 2018-10-12 | End: 2018-10-17 | Stop reason: HOSPADM

## 2018-10-12 RX ORDER — AMLODIPINE BESYLATE 5 MG/1
5 TABLET ORAL DAILY
Status: DISCONTINUED | OUTPATIENT
Start: 2018-10-13 | End: 2018-10-12 | Stop reason: DRUGHIGH

## 2018-10-12 RX ORDER — HEPARIN SODIUM 5000 [USP'U]/ML
5000 INJECTION, SOLUTION INTRAVENOUS; SUBCUTANEOUS EVERY 8 HOURS
Status: DISCONTINUED | OUTPATIENT
Start: 2018-10-12 | End: 2018-10-17 | Stop reason: HOSPADM

## 2018-10-12 RX ORDER — TRAZODONE HYDROCHLORIDE 100 MG/1
200 TABLET ORAL
COMMUNITY
Start: 2018-10-01 | End: 2018-10-17

## 2018-10-12 RX ORDER — DONEPEZIL HYDROCHLORIDE 10 MG/1
10 TABLET, FILM COATED ORAL
COMMUNITY
Start: 2018-09-27

## 2018-10-12 RX ORDER — AMLODIPINE BESYLATE 10 MG/1
10 TABLET ORAL DAILY
Status: DISCONTINUED | OUTPATIENT
Start: 2018-10-13 | End: 2018-10-17 | Stop reason: HOSPADM

## 2018-10-12 RX ORDER — DIVALPROEX SODIUM 125 MG/1
125 TABLET, DELAYED RELEASE ORAL 2 TIMES DAILY
COMMUNITY

## 2018-10-12 RX ORDER — TRAZODONE HYDROCHLORIDE 50 MG/1
200 TABLET ORAL
Status: DISCONTINUED | OUTPATIENT
Start: 2018-10-12 | End: 2018-10-17 | Stop reason: HOSPADM

## 2018-10-12 RX ORDER — POLYETHYLENE GLYCOL 3350 17 G/17G
17 POWDER, FOR SOLUTION ORAL DAILY
Status: DISCONTINUED | OUTPATIENT
Start: 2018-10-12 | End: 2018-10-17 | Stop reason: HOSPADM

## 2018-10-12 RX ORDER — HYDRALAZINE HYDROCHLORIDE 20 MG/ML
10 INJECTION INTRAMUSCULAR; INTRAVENOUS
Status: DISCONTINUED | OUTPATIENT
Start: 2018-10-12 | End: 2018-10-17 | Stop reason: HOSPADM

## 2018-10-12 RX ORDER — ACETAMINOPHEN 325 MG/1
650 TABLET ORAL
Status: DISCONTINUED | OUTPATIENT
Start: 2018-10-12 | End: 2018-10-17 | Stop reason: HOSPADM

## 2018-10-12 RX ORDER — AMOXICILLIN 250 MG
2 CAPSULE ORAL
Status: DISCONTINUED | OUTPATIENT
Start: 2018-10-12 | End: 2018-10-17 | Stop reason: HOSPADM

## 2018-10-12 RX ORDER — METOPROLOL TARTRATE 25 MG/1
25 TABLET, FILM COATED ORAL DAILY
Status: DISCONTINUED | OUTPATIENT
Start: 2018-10-13 | End: 2018-10-17 | Stop reason: HOSPADM

## 2018-10-12 RX ORDER — SODIUM CHLORIDE 9 MG/ML
150 INJECTION, SOLUTION INTRAVENOUS CONTINUOUS
Status: DISCONTINUED | OUTPATIENT
Start: 2018-10-12 | End: 2018-10-17 | Stop reason: HOSPADM

## 2018-10-12 RX ORDER — HALOPERIDOL 5 MG/ML
2 INJECTION INTRAMUSCULAR
Status: DISCONTINUED | OUTPATIENT
Start: 2018-10-12 | End: 2018-10-17 | Stop reason: HOSPADM

## 2018-10-12 RX ORDER — MEMANTINE HYDROCHLORIDE 5 MG/1
10 TABLET ORAL DAILY
Status: DISCONTINUED | OUTPATIENT
Start: 2018-10-13 | End: 2018-10-17 | Stop reason: HOSPADM

## 2018-10-12 RX ADMIN — HEPARIN SODIUM 5000 UNITS: 5000 INJECTION INTRAVENOUS; SUBCUTANEOUS at 22:31

## 2018-10-12 RX ADMIN — SODIUM CHLORIDE 75 ML/HR: 900 INJECTION, SOLUTION INTRAVENOUS at 23:44

## 2018-10-12 RX ADMIN — SENNOSIDES AND DOCUSATE SODIUM 2 TABLET: 8.6; 5 TABLET ORAL at 22:32

## 2018-10-12 RX ADMIN — Medication 5 ML: at 16:24

## 2018-10-12 RX ADMIN — TRAZODONE HYDROCHLORIDE 200 MG: 50 TABLET ORAL at 22:32

## 2018-10-12 RX ADMIN — TUBERCULIN PURIFIED PROTEIN DERIVATIVE 5 UNITS: 5 INJECTION, SOLUTION INTRADERMAL at 15:00

## 2018-10-12 RX ADMIN — SODIUM CHLORIDE 1000 ML: 900 INJECTION, SOLUTION INTRAVENOUS at 14:40

## 2018-10-12 RX ADMIN — Medication 10 ML: at 22:32

## 2018-10-12 RX ADMIN — RISPERIDONE 2 MG: 1 TABLET ORAL at 22:31

## 2018-10-12 RX ADMIN — SODIUM CHLORIDE 125 ML/HR: 900 INJECTION, SOLUTION INTRAVENOUS at 16:24

## 2018-10-12 NOTE — PROGRESS NOTES
10/12/18 1625 Dual Skin Pressure Injury Assessment Dual Skin Pressure Injury Assessment WDL Second Care Provider (Based on [address] [place] [address]) Evonne Russo RN Skin Integumentary Skin Integumentary (WDL) WDL Skin Condition/Temp Dry;Flaky

## 2018-10-12 NOTE — ED TRIAGE NOTES
Arrives via EMS, fell around 230AM. Pt is still able to walk around with his walker, pt is now incontinent but unsure why. Pt is more lethargic than normal per EMS. 130/80, BGl 107, HR 70s. Wife states pt hit his head when he fell

## 2018-10-12 NOTE — H&P
HOSPITALIST H&P/CONSULTNAME:  Charito Bain Age:  75 y.o. 
:   1945 MRN:   [id number] PCP: Mich Boudreaux MD: Treatment Team: Attending Provider: Nagi Larose MD; Primary Nurse: Inga River RN 
HPI:  
 
Pt is a 74 yo male with pmh dementia with behavioral disturbances, HTN, CVA, CAD who has had multiple ER visits this year. He presented to ER today via EMS due to his wife unable to pick him up off floor after fall at home. Pt provides no history of event due to dementia. Wife reports that pt fell around 0230 and she wasn't able to get him up until a family member came to visit hours later, when EMS was eventually called. Pt drowsy on arrival.  Lab work overall okay except CK elevated. Vitals stable. CT head without acute findings. He denies pain at this time. Family is hopeful for d/c to STR due to pt's weakness and recent falls. Complete ROS done and is as stated in HPI or otherwise negative Past Medical History:  
Diagnosis Date  CAD (coronary artery disease)  Chronic back pain  Hypertension  Stroke ([facility] [address]   
 w/ L side effected Past Surgical History:  
Procedure Laterality Date  HX HEENT    
 repair to L surrounding eye tissues Prior to Admission Medications Prescriptions Last Dose Informant Patient Reported? Taking? amLODIPine (NORVASC) 10 mg tablet  Self Yes No  
Sig: Take 5 mg by mouth daily. donepezil (ARICEPT) 10 mg tablet   Yes Yes Sig: Take 10 mg by mouth. donepezil (ARICEPT) 5 mg tablet  Self Yes No  
Sig: Take  by mouth nightly.  
ketoconazole (NIZORAL) 2 % topical cream  Self Yes No  
Sig: Apply  to affected area daily. lisinopril-hydroCHLOROthiazide (PRINZIDE, ZESTORETIC) 20-12.5 mg per tablet   Yes No  
Sig: Take  by mouth daily. memantine (NAMENDA) 10 mg tablet   Yes No  
Sig: Take 10 mg by mouth daily. metoprolol (LOPRESSOR) 50 mg tablet  Self Yes No  
Sig: Take 25 mg by mouth daily. nitroglycerin (NITROSTAT) 0.4 mg SL tablet   No No  
Sig: Place one tab under tongue every 5 minutes as needed for chest pain, up to three in a row, for any given spell of chest pain  
nitroglycerin (NITROSTAT) 0.4 mg tablet  Self Yes No  
Sig: take 0.4 mg by mouth every five (5) minutes as needed. omeprazole (PRILOSEC OTC) 20 mg tablet   No No  
Sig: Take 20 mg by mouth daily. risperiDONE (RISPERDAL) 2 mg tablet   Yes Yes Sig: Take 2 mg by mouth. traZODone (DESYREL) 100 mg tablet   Yes Yes Sig: Take 200 mg by mouth. Facility-Administered Medications: None Allergies Allergen Reactions  Pcn [Penicillins] Not Reported This Time  Pcn [Penicillins] Unknown (comments) Social History Substance Use Topics  Smoking status: Former Smoker Packs/day: 0.25 Years: 60.00 Quit date: 2017  Smokeless tobacco: Never Used  Alcohol use No  
   Comment: occsaional  
  
History reviewed. No pertinent family history. Objective:  
 
Visit Vitals  /90  Pulse 76  Temp 98.5 °F (36.9 °C)  Resp 20  
 Ht 5' 8\" (1.727 m)  Wt 77.1 kg (170 lb)  SpO2 99%  BMI 25.85 kg/m2 Temp (24hrs), Av.5 °F (36.9 °C), Min:98.5 °F (36.9 °C), Max:98.5 °F (36.9 °C) Oxygen Therapy O2 Sat (%): 99 % (10/12/18 1209) Pulse via Oximetry: 78 beats per minute (10/12/18 1209) O2 Device: Room air (10/12/18 1056) Physical Exam: 
General:    Drowsy, mostly non cooperative, no distress Head:   Normocephalic, without obvious abnormality, atraumatic. Nose:  Nares normal. No drainage or sinus tenderness. Lungs:   Clear to auscultation bilaterally. No Wheezing or Rhonchi. No rales. Heart:   Regular rate and rhythm,  no murmur, rub or gallop. Abdomen:   Soft, non-tender. Not distended. Bowel sounds normal.  
Extremities: No cyanosis. No edema. No clubbing Skin:     Texture, turgor normal. No rashes or lesions. Not Jaundiced Neurologic: Oriented to self, no focal deficits Data Review:  
Recent Results (from the past 24 hour(s)) EKG, 12 LEAD, INITIAL Collection Time: 10/12/18 10:52 AM  
Result Value Ref Range Ventricular Rate 72 BPM  
 Atrial Rate 72 BPM  
 P-R Interval 168 ms QRS Duration 82 ms Q-T Interval 372 ms QTC Calculation (Bezet) 407 ms Calculated P Axis 78 degrees Calculated R Axis 3 degrees Calculated T Axis 26 degrees Diagnosis    
  !! AGE AND GENDER SPECIFIC ECG ANALYSIS !! Sinus rhythm with sinus arrhythmia Normal ECG When compared with ECG of 17-JUL-2018 21:08, 
Premature ventricular complexes are no longer Present Confirmed by Mo Kate MD (), Ruth Alan ([id number]) on 10/12/2018 1:58:13 PM 
  
CBC WITH AUTOMATED DIFF Collection Time: 10/12/18 10:53 AM  
Result Value Ref Range WBC 6.7 4.3 - 11.1 K/uL  
 RBC 4.17 (L) 4.23 - 5.6 M/uL  
 HGB 12.8 (L) 13.6 - 17.2 g/dL HCT 38.3 (L) 41.1 - 50.3 % MCV 91.8 79.6 - 97.8 FL  
 MCH 30.7 26.1 - 32.9 PG  
 MCHC 33.4 31.4 - 35.0 g/dL  
 RDW 13.0 % PLATELET 751 229 - 065 K/uL MPV 11.0 9.4 - 12.3 FL ABSOLUTE NRBC 0.00 0.0 - 0.2 K/uL  
 DF AUTOMATED NEUTROPHILS 67 43 - 78 % LYMPHOCYTES 23 13 - 44 % MONOCYTES 10 4.0 - 12.0 % EOSINOPHILS 0 (L) 0.5 - 7.8 % BASOPHILS 0 0.0 - 2.0 % IMMATURE GRANULOCYTES 0 0.0 - 5.0 %  
 ABS. NEUTROPHILS 4.5 1.7 - 8.2 K/UL  
 ABS. LYMPHOCYTES 1.5 0.5 - 4.6 K/UL  
 ABS. MONOCYTES 0.7 0.1 - 1.3 K/UL  
 ABS. EOSINOPHILS 0.0 0.0 - 0.8 K/UL  
 ABS. BASOPHILS 0.0 0.0 - 0.2 K/UL  
 ABS. IMM. GRANS. 0.0 0.0 - 0.5 K/UL METABOLIC PANEL, COMPREHENSIVE Collection Time: 10/12/18 10:53 AM  
Result Value Ref Range Sodium 143 136 - 145 mmol/L Potassium 3.8 3.5 - 5.1 mmol/L Chloride 109 (H) 98 - 107 mmol/L  
 CO2 26 21 - 32 mmol/L Anion gap 8 7 - 16 mmol/L Glucose 93 65 - 100 mg/dL BUN 13 8 - 23 MG/DL Creatinine 1.52 (H) 0.8 - 1.5 MG/DL  
 GFR est AA 58 (L) >60 ml/min/1.73m2 GFR est non-AA 48 (L) >60 ml/min/1.73m2 Calcium 8.8 8.3 - 10.4 MG/DL Bilirubin, total 0.4 0.2 - 1.1 MG/DL  
 ALT (SGPT) 36 12 - 65 U/L  
 AST (SGOT) 63 (H) 15 - 37 U/L Alk. phosphatase 68 50 - 136 U/L Protein, total 7.3 6.3 - 8.2 g/dL Albumin 3.5 3.2 - 4.6 g/dL Globulin 3.8 (H) 2.3 - 3.5 g/dL A-G Ratio 0.9 (L) 1.2 - 3.5 CK Collection Time: 10/12/18 10:53 AM  
Result Value Ref Range CK 3078 (H) 21 - 215 U/L Imaging Angie Corado Hernández File CT Head Impression: Worsened degenerative change in the spine, Normal x-ray hips, If 
avascular necrosis is suspected, MRI would be recommended. Assessment and Plan: Active Hospital Problems Diagnosis Date Noted  Recurrent falls 10/12/2018 A/P: 
 
Falls at home, rhabdo r/t fall-  Consult PT/OT. NS @ 125. Trend CK. Advancing dementia- Follows with [facility]. Cont home regimen Aricept, Namenda, holding sedating meds until pt more alert. Also awaiting PTA med list to be udpated. DC planning- Consult SW.  Likely needs STR. DVT Prophylaxis:  Heparin Code Status: Full Anticipated discharge: 48-72 hours Signed By: Marybeth Andersen NP October 12, 2018

## 2018-10-12 NOTE — ED NOTES
TRANSFER - OUT REPORT: 
 
Verbal report given to [facility] on Aj Cardona  being transferred to [id number] for routine progression of care Report consisted of patients Situation, Background, Assessment and  
Recommendations(SBAR). Information from the following report(s) ED Summary was reviewed with the receiving nurse. Lines:  
Peripheral IV 10/12/18 Left Forearm (Active) Site Assessment Clean, dry, & intact 10/12/2018  2:41 PM  
Phlebitis Assessment 0 10/12/2018  2:41 PM  
Infiltration Assessment 0 10/12/2018  2:41 PM  
Dressing Status Clean, dry, & intact 10/12/2018  2:41 PM  
  
 
Opportunity for questions and clarification was provided. Patient transported with: 
Transport

## 2018-10-12 NOTE — ED PROVIDER NOTES
HPI Comments: Per nurse's notes: \"Arrives via EMS, fell around 230AM. Pt is still able to walk around with his walker, pt is now incontinent but unsure why. Pt is more lethargic than normal per EMS. 130/80, BGl 107, HR 70s. Wife states pt hit his head when he fell\" Patient is a 75 y.o. male presenting with fall. The history is provided by the patient and the spouse. The history is limited by the condition of the patient. Fall The accident occurred 6 to 12 hours ago. The fall occurred while walking. He fell from a height of ground level. He landed on hard floor. There was no blood loss. The point of impact was the left hip and head. The pain is present in the left hip and head. The pain is at a severity of 5/10. He was not ambulatory at the scene (patient laid on the floor for several hours prior to being helped up by his son). There was no entrapment after the fall. There was no drug use involved in the accident. There was no alcohol use involved in the accident. Pertinent negatives include no visual change, no fever, no numbness, no abdominal pain, no bowel incontinence, no nausea, no vomiting, no hematuria, no headaches, no extremity weakness, no hearing loss, no loss of consciousness, no tingling and no laceration. The risk factors include dementia and being elderly. The symptoms are aggravated by pressure on injury. He has tried rest for the symptoms. The treatment provided no relief. It is unknown when the patient last had a tetanus shot. Past Medical History:  
Diagnosis Date  CAD (coronary artery disease)  Chronic back pain  Hypertension  Stroke ([facility] [address]   
 w/ L side effected Past Surgical History:  
Procedure Laterality Date  HX HEENT    
 repair to L surrounding eye tissues History reviewed. No pertinent family history. Social History Social History  Marital status:    Spouse name: N/A  
 Number of children: N/A  
  Years of education: N/A Occupational History  Not on file. Social History Main Topics  Smoking status: Former Smoker Packs/day: 0.25 Years: 60.00 Quit date: 1/16/2017  Smokeless tobacco: Never Used  Alcohol use No  
   Comment: occsaional  
 Drug use: No  
 Sexual activity: Not on file Other Topics Concern  Not on file Social History Narrative ** Merged History Encounter ** ALLERGIES: Pcn [penicillins] and Pcn [penicillins] Review of Systems Unable to perform ROS: Dementia Constitutional: Negative for fever. Gastrointestinal: Negative for abdominal pain, bowel incontinence, nausea and vomiting. Genitourinary: Negative for hematuria. Musculoskeletal: Negative for extremity weakness. Neurological: Negative for tingling, loss of consciousness, numbness and headaches. Vitals:  
 10/12/18 1045 10/12/18 1056 10/12/18 1209 BP: (!) 141/93  163/90 Pulse: 81  76 Resp: 18  20 Temp: 98.5 °F (36.9 °C) SpO2: 98% 98% 99% Weight: 77.1 kg (170 lb) Height: 5' 8\" (1.727 m) Physical Exam  
Constitutional: He appears well-developed and well-nourished. He appears lethargic. No distress. HENT:  
Head: Normocephalic and atraumatic. Right Ear: Tympanic membrane and external ear normal.  
Left Ear: Tympanic membrane and external ear normal.  
Mouth/Throat: Oropharynx is clear and moist.  
Eyes: Conjunctivae and EOM are normal. Pupils are equal, round, and reactive to light. Neck: Normal range of motion. Neck supple. No tracheal deviation present. Cardiovascular: Normal rate, regular rhythm, normal heart sounds and intact distal pulses. Exam reveals no gallop and no friction rub. No murmur heard. Pulmonary/Chest: Effort normal and breath sounds normal. No respiratory distress. He has no wheezes. Abdominal: Soft.  Bowel sounds are normal. He exhibits no distension and no mass. There is no hepatosplenomegaly. There is no tenderness. There is no rebound and no guarding. Musculoskeletal: Normal range of motion. He exhibits no edema. Lymphadenopathy:  
  He has no cervical adenopathy. Neurological: He has normal strength. He appears lethargic. He is disoriented. He displays normal reflexes. No cranial nerve deficit or sensory deficit. Skin: Skin is warm and dry. No laceration and no rash noted. He is not diaphoretic. No erythema. Psychiatric: He has a normal mood and affect. His speech is normal. He is slowed. Cognition and memory are impaired. Nursing note and vitals reviewed. MDM Number of Diagnoses or Management Options Contusion of left hip, initial encounter: new and requires workup Dementia without behavioral disturbance, unspecified dementia type: new and requires workup Fall, initial encounter: new and requires workup Sprain of left ankle, unspecified ligament, initial encounter: new and requires workup Traumatic rhabdomyolysis, initial encounter [facility]): new and requires workup Amount and/or Complexity of Data Reviewed Clinical lab tests: ordered and reviewed Tests in the radiology section of CPT®: ordered and reviewed Obtain history from someone other than the patient: yes Review and summarize past medical records: yes Discuss the patient with other providers: yes Independent visualization of images, tracings, or specimens: yes Risk of Complications, Morbidity, and/or Mortality Presenting problems: high Diagnostic procedures: high Management options: high Patient Progress Patient progress: stable ED Course Procedures

## 2018-10-12 NOTE — PROGRESS NOTES
Admission database completed. Spouse oriented to room and call button. Spouse  given patient services book which includes  medication side effects fact sheet  given to spouse and reviewed No concerns voiced at this time Primary nurse  updated

## 2018-10-12 NOTE — DISCHARGE INSTRUCTIONS
Dementia: Care Instructions  Your Care Instructions    Dementia is a loss of mental skills that affects your daily life. It is different than the occasional trouble with memory that is part of aging. You may find it hard to remember things that you feel you should be able to remember. Or you may feel that your mind is just not working as well as usual.  Finding out that you have dementia is a shock. You may be afraid and worried about how the condition will change your life. Although there is no cure at this time, medicine may slow memory loss and improve thinking for a while. Other medicines may be able to help you sleep or cope with depression and behavior changes. Dementia often gets worse slowly. But it can get worse quickly. As dementia gets worse, it may become harder to do common things that take planning, like making a list and going shopping. Over time, the disease may make it hard for you to take care of yourself. Some people with dementia need others to help care for them. Dementia is different for everyone. You may be able to function well for a long time. In the early stage of the condition, you can do things at home to make life easier and safer. You also can keep doing your hobbies and other activities. Many people find comfort in planning now for their future needs. Follow-up care is a key part of your treatment and safety. Be sure to make and go to all appointments, and call your doctor if you are having problems. It's also a good idea to know your test results and keep a list of the medicines you take. How can you care for yourself at home? · Take your medicines exactly as prescribed. Call your doctor if you think you are having a problem with your medicine. · Eat healthy foods. Eat lots of whole grains, fruits, and vegetables every day.  If you are not hungry, try snacks or nutritional drinks such as Boost, Ensure, or Sustacal.  · If you have problems sleeping:  ¨ Try not to nap too close to your bedtime. ¨ Exercise regularly. Walking is a good choice. ¨ Try a glass of warm milk or caffeine-free herbal tea before bed. · Do tasks and activities during the time of day when you feel your best. It may help to develop a daily routine. · Post labels, lists, and sticky notes to help you remember things. Write your activities on a calendar you can easily find. Put your clock where you can easily see it. · Stay active. Take walks in familiar places, or with friends or loved ones. Try to stay active mentally too. Read and work crossword puzzles if you enjoy these activities. · Do not drive unless you can pass an on-road driving test. If you are not sure if you are safe to drive, your state 's license bureau can test you. · Keep a cordless phone and a flashlight with new batteries by your bed. If possible, put a phone in each of the main rooms of your house, or carry a cell phone in case you fall and cannot reach a phone. Or, you can wear a device around your neck or wrist. You push a button that sends a signal for help. Acknowledge your emotions and plan for the future  · Talk openly and honestly with your doctor. · Let yourself grieve. It is common to feel angry, scared, frustrated, anxious, or depressed. · Get emotional support from family, friends, a support group, or a counselor experienced in working with people who have dementia. · Ask for help if you need it. · Plan for the future. ¨ Talk to your family and doctor about preparing a living will and other important papers while you can make decisions. These papers tell your doctors how to care for you at the end of your life. ¨ Consider naming a person to make decisions about your care if you are not able to. When should you call for help? Call 911 anytime you think you may need emergency care.  For example, call if:    · You are lost and do not know whom to call.     · You are injured and do not know whom to call.    Call your doctor now or seek immediate medical care if:    · You are more confused or upset than usual.     · You feel like you could hurt yourself because your mind is not working well.   Jaun Forresters closely for changes in your health, and be sure to contact your doctor if you have any problems. Where can you learn more? Go to http://deirdre-tonny.info/. Enter C276 in the search box to learn more about \"Dementia: Care Instructions. \"  Current as of: December 7, 2017  Content Version: 11.8  © 7675-1938 [employer]. Care instructions adapted under license by [employer] (which disclaims liability or warranty for this information). If you have questions about a medical condition or this instruction, always ask your healthcare professional. [address] any warranty or liability for your use of this information. Preventing Falls: Care Instructions  Your Care Instructions    Getting around your home safely can be a challenge if you have injuries or health problems that make it easy for you to fall. Loose rugs and furniture in walkways are among the dangers for many older people who have problems walking or who have poor eyesight. People who have conditions such as arthritis, osteoporosis, or dementia also have to be careful not to fall. You can make your home safer with a few simple measures. Follow-up care is a key part of your treatment and safety. Be sure to make and go to all appointments, and call your doctor if you are having problems. It's also a good idea to know your test results and keep a list of the medicines you take. How can you care for yourself at home? Taking care of yourself  · You may get dizzy if you do not drink enough water. To prevent dehydration, drink plenty of fluids, enough so that your urine is light yellow or clear like water. Choose water and other caffeine-free clear liquids.  If you have kidney, heart, or liver disease and have to limit fluids, talk with your doctor before you increase the amount of fluids you drink. · Exercise regularly to improve your strength, muscle tone, and balance. Walk if you can. Swimming may be a good choice if you cannot walk easily. · Have your vision and hearing checked each year or any time you notice a change. If you have trouble seeing and hearing, you might not be able to avoid objects and could lose your balance. · Know the side effects of the medicines you take. Ask your doctor or pharmacist whether the medicines you take can affect your balance. Sleeping pills or sedatives can affect your balance. · Limit the amount of alcohol you drink. Alcohol can impair your balance and other senses. · Ask your doctor whether calluses or corns on your feet need to be removed. If you wear loose-fitting shoes because of calluses or corns, you can lose your balance and fall. · Talk to your doctor if you have numbness in your feet. Preventing falls at home  · Remove raised doorway thresholds, throw rugs, and clutter. Repair loose carpet or raised areas in the floor. · Move furniture and electrical cords to keep them out of walking paths. · Use nonskid floor wax, and wipe up spills right away, especially on ceramic tile floors. · If you use a walker or cane, put rubber tips on it. If you use crutches, clean the bottoms of them regularly with an abrasive pad, such as steel wool. · Keep your house well lit, especially Jagjit Shira, and outside walkways. Use night-lights in areas such as hallways and bathrooms. Add extra light switches or use remote switches (such as switches that go on or off when you clap your hands) to make it easier to turn lights on if you have to get up during the night. · Install sturdy handrails on stairways. · Move items in your cabinets so that the things you use a lot are on the lower shelves (about waist level).   · Keep a cordless phone and a flashlight with new batteries by your bed. If possible, put a phone in each of the main rooms of your house, or carry a cell phone in case you fall and cannot reach a phone. Or, you can wear a device around your neck or wrist. You push a button that sends a signal for help. · Wear low-heeled shoes that fit well and give your feet good support. Use footwear with nonskid soles. Check the heels and soles of your shoes for wear. Repair or replace worn heels or soles. · Do not wear socks without shoes on wood floors. · Walk on the grass when the sidewalks are slippery. If you live in an area that gets snow and ice in the winter, sprinkle salt on slippery steps and sidewalks. Preventing falls in the bath  · Install grab bars and nonskid mats inside and outside your shower or tub and near the toilet and sinks. · Use shower chairs and bath benches. · Use a hand-held shower head that will allow you to sit while showering. · Get into a tub or shower by putting the weaker leg in first. Get out of a tub or shower with your strong side first.  · Repair loose toilet seats and consider installing a raised toilet seat to make getting on and off the toilet easier. · Keep your bathroom door unlocked while you are in the shower. Where can you learn more? Go to http://deirdre-tonny.info/. Enter [id number] [id number] in the search box to learn more about \"Preventing Falls: Care Instructions. \"  Current as of: March 16, 2018  Content Version: 11.8  © 3949-5275 [employer]. Care instructions adapted under license by [employer] (which disclaims liability or warranty for this information). If you have questions about a medical condition or this instruction, always ask your healthcare professional. [address] any warranty or liability for your use of this information. Learning About RICE (Rest, Ice, Compression, and Elevation)  What is RICE? RICE is a way to care for an injury.  RICE helps relieve pain and swelling. It may also help with healing and flexibility. RICE stands for:  · Rest and protect the injured or sore area. · Ice or a cold pack used as soon as possible. · Compression, or wrapping the injured or sore area with an elastic bandage. · Elevation (propping up) the injured or sore area. How do you do RICE? You can use RICE for home treatment when you have general aches and pains or after an injury or surgery. Rest  · Do not put weight on the injury for at least 24 to 48 hours. · Use crutches for a badly sprained knee or ankle. · Support a sprained wrist, elbow, or shoulder with a sling. Ice  · Put ice or a cold pack on the injury right away to reduce pain and swelling. Frozen vegetables will also work as an ice pack. Put a thin cloth between the ice or cold pack and your skin. The cloth protects the injured area from getting too cold. · Use ice for 10 to 15 minutes at a time for the first 48 to 72 hours. Compression  · Use compression for sprains, strains, and surgeries of the arms and legs. · Wrap the injured area with an elastic bandage or compression sleeve to reduce swelling. · Don't wrap it too tightly. If the area below it feels numb, tingles, or feels cool, loosen the wrap. Elevation  · Use elevation for areas of the body that can be propped up, such as arms and legs. · Prop up the injured area on pillows whenever you use ice. Keep it propped up anytime you sit or lie down. · Try to keep the injured area at or above the level of your heart. This will help reduce swelling and bruising. Where can you learn more? Go to http://deirdre-tonny.info/. Enter Z974 in the search box to learn more about \"Learning About RICE (Rest, Ice, Compression, and Elevation). \"  Current as of: November 29, 2017  Content Version: 11.8  © 5231-4399 [employer].  Care instructions adapted under license by [employer] (which disclaims liability or warranty for this information). If you have questions about a medical condition or this instruction, always ask your healthcare professional. [place] [address] any warranty or liability for your use of this information.

## 2018-10-12 NOTE — PROGRESS NOTES
TRANSFER - IN REPORT: 
 
Verbal report received from [facility]) on María Fang  being received from ED (unit) for routine progression of care Report consisted of patients Situation, Background, Assessment and  
Recommendations(SBAR). Information from the following report(s) SBAR, Kardex, ED Summary, Intake/Output, MAR and Recent Results was reviewed with the receiving nurse. Opportunity for questions and clarification was provided. Assessment completed upon patients arrival to unit and care assumed.

## 2018-10-13 PROBLEM — M62.82 RHABDOMYOLYSIS: Status: ACTIVE | Noted: 2018-10-13

## 2018-10-13 PROBLEM — M62.82 NON-TRAUMATIC RHABDOMYOLYSIS: Status: ACTIVE | Noted: 2018-10-13

## 2018-10-13 LAB
ANION GAP SERPL CALC-SCNC: 6 MMOL/L (ref 7–16)
BUN SERPL-MCNC: 12 MG/DL (ref 8–23)
CALCIUM SERPL-MCNC: 8.2 MG/DL (ref 8.3–10.4)
CHLORIDE SERPL-SCNC: 112 MMOL/L (ref 98–107)
CK SERPL-CCNC: 3786 U/L (ref 21–215)
CO2 SERPL-SCNC: 27 MMOL/L (ref 21–32)
CREAT SERPL-MCNC: 1.49 MG/DL (ref 0.8–1.5)
ERYTHROCYTE [DISTWIDTH] IN BLOOD BY AUTOMATED COUNT: 13.1 %
GLUCOSE BLD STRIP.AUTO-MCNC: 108 MG/DL (ref 65–100)
GLUCOSE BLD STRIP.AUTO-MCNC: 112 MG/DL (ref 65–100)
GLUCOSE BLD STRIP.AUTO-MCNC: 95 MG/DL (ref 65–100)
GLUCOSE SERPL-MCNC: 74 MG/DL (ref 65–100)
HCT VFR BLD AUTO: 36.2 % (ref 41.1–50.3)
HGB BLD-MCNC: 12 G/DL (ref 13.6–17.2)
MCH RBC QN AUTO: 30.6 PG (ref 26.1–32.9)
MCHC RBC AUTO-ENTMCNC: 33.1 G/DL (ref 31.4–35)
MCV RBC AUTO: 92.3 FL (ref 79.6–97.8)
MM INDURATION POC: 0 MM (ref 0–5)
NRBC # BLD: 0 K/UL (ref 0–0.2)
PLATELET # BLD AUTO: 163 K/UL (ref 150–450)
PMV BLD AUTO: 11 FL (ref 9.4–12.3)
POTASSIUM SERPL-SCNC: 3.6 MMOL/L (ref 3.5–5.1)
PPD POC: NORMAL NEGATIVE
RBC # BLD AUTO: 3.92 M/UL (ref 4.23–5.6)
SODIUM SERPL-SCNC: 145 MMOL/L (ref 136–145)
WBC # BLD AUTO: 5.1 K/UL (ref 4.3–11.1)

## 2018-10-13 PROCEDURE — 82550 ASSAY OF CK (CPK): CPT

## 2018-10-13 PROCEDURE — G8996 SWALLOW CURRENT STATUS: HCPCS

## 2018-10-13 PROCEDURE — G8988 SELF CARE GOAL STATUS: HCPCS

## 2018-10-13 PROCEDURE — 65660000000 HC RM CCU STEPDOWN

## 2018-10-13 PROCEDURE — G8987 SELF CARE CURRENT STATUS: HCPCS

## 2018-10-13 PROCEDURE — 80048 BASIC METABOLIC PNL TOTAL CA: CPT

## 2018-10-13 PROCEDURE — 74011250637 HC RX REV CODE- 250/637: Performed by: INTERNAL MEDICINE

## 2018-10-13 PROCEDURE — 36415 COLL VENOUS BLD VENIPUNCTURE: CPT

## 2018-10-13 PROCEDURE — 74011250637 HC RX REV CODE- 250/637: Performed by: NURSE PRACTITIONER

## 2018-10-13 PROCEDURE — 97530 THERAPEUTIC ACTIVITIES: CPT

## 2018-10-13 PROCEDURE — 85027 COMPLETE CBC AUTOMATED: CPT

## 2018-10-13 PROCEDURE — 74011250636 HC RX REV CODE- 250/636: Performed by: INTERNAL MEDICINE

## 2018-10-13 PROCEDURE — 97166 OT EVAL MOD COMPLEX 45 MIN: CPT

## 2018-10-13 PROCEDURE — 74011000250 HC RX REV CODE- 250: Performed by: INTERNAL MEDICINE

## 2018-10-13 PROCEDURE — 77030020263 HC SOL INJ SOD CL0.9% LFCR 1000ML

## 2018-10-13 PROCEDURE — 97161 PT EVAL LOW COMPLEX 20 MIN: CPT

## 2018-10-13 PROCEDURE — 92610 EVALUATE SWALLOWING FUNCTION: CPT

## 2018-10-13 PROCEDURE — 99218 HC RM OBSERVATION: CPT

## 2018-10-13 PROCEDURE — G8997 SWALLOW GOAL STATUS: HCPCS

## 2018-10-13 PROCEDURE — 82962 GLUCOSE BLOOD TEST: CPT

## 2018-10-13 RX ADMIN — SODIUM CHLORIDE 150 ML/HR: 900 INJECTION, SOLUTION INTRAVENOUS at 19:54

## 2018-10-13 RX ADMIN — METOPROLOL TARTRATE 25 MG: 25 TABLET ORAL at 08:08

## 2018-10-13 RX ADMIN — POLYETHYLENE GLYCOL 3350 17 G: 17 POWDER, FOR SOLUTION ORAL at 08:08

## 2018-10-13 RX ADMIN — TRAZODONE HYDROCHLORIDE 200 MG: 50 TABLET ORAL at 21:26

## 2018-10-13 RX ADMIN — HEPARIN SODIUM 5000 UNITS: 5000 INJECTION INTRAVENOUS; SUBCUTANEOUS at 13:17

## 2018-10-13 RX ADMIN — Medication 5 ML: at 13:17

## 2018-10-13 RX ADMIN — HEPARIN SODIUM 5000 UNITS: 5000 INJECTION INTRAVENOUS; SUBCUTANEOUS at 05:24

## 2018-10-13 RX ADMIN — AMLODIPINE BESYLATE 10 MG: 10 TABLET ORAL at 08:08

## 2018-10-13 RX ADMIN — MEMANTINE HYDROCHLORIDE 10 MG: 5 TABLET ORAL at 08:08

## 2018-10-13 RX ADMIN — SENNOSIDES AND DOCUSATE SODIUM 2 TABLET: 8.6; 5 TABLET ORAL at 21:27

## 2018-10-13 RX ADMIN — HEPARIN SODIUM 5000 UNITS: 5000 INJECTION INTRAVENOUS; SUBCUTANEOUS at 21:27

## 2018-10-13 RX ADMIN — Medication 5 ML: at 05:24

## 2018-10-13 RX ADMIN — DONEPEZIL HYDROCHLORIDE 10 MG: 5 TABLET, FILM COATED ORAL at 08:08

## 2018-10-13 RX ADMIN — RISPERIDONE 2 MG: 1 TABLET ORAL at 21:27

## 2018-10-13 RX ADMIN — Medication 5 ML: at 21:27

## 2018-10-13 NOTE — PROGRESS NOTES
Problem: Mobility Impaired (Adult and Pediatric) Goal: *Acute Goals and Plan of Care (Insert Text) LTG: 
(1.)Mr. La Hill will move from supine to sit and sit to supine , scoot up and down and roll side to side in bed with MINIMAL ASSIST within 7 treatment day(s). (2.)Mr. La Hill will transfer from bed to chair and chair to bed with MODERATE ASSIST using the least restrictive device within 7 treatment day(s). (3.)Mr. La Hill will ambulate with MODERATE ASSIST for >or=10 feet with the least restrictive device, fair dynamic standing balance within 7 treatment day(s). (4.)Mr. La Hill will tolerate sitting upright in bedside chair x 2-4 hours to increase tolerance for physical activity within 7 treatment days. (5.)Mr. La Hill will perform B LE therapeutic exercises x 20 reps with MINIMAL ASSIST within 7 days to increase strength for improved safety and independence in transfers and gait. 
________________________________________________________________________________________________ PHYSICAL THERAPY: Initial Assessment 10/13/2018INPATIENT: Hospital Day: 2 Payor: [facility] MEDICARE / Plan: [facility] [place] / Product Type: Managed Care Medicare /  
  
NAME/AGE/GENDER: Tori Guerrier is a 75 y.o. male PRIMARY DIAGNOSIS: Recurrent falls Rhabdomyolysis Non-traumatic rhabdomyolysis Non-traumatic rhabdomyolysis ICD-10: Treatment Diagnosis:  
 · Generalized Muscle Weakness (M62.81) · Other lack of cordination (R27.8) · Difficulty in walking, Not elsewhere classified (R26.2) · History of falling (Z91.81) Precaution/Allergies: 
Pcn [penicillins] and Pcn [penicillins] ASSESSMENT:  
Mr. La Hill presents supine in bed with wife at bedside. Pt drowsy but opens eyes when name is called. Pt and wife are agreeable to treatment. Pt admitted following a fall with diagnosis of rhabdomyolysis. Wife states that this has been pt's only fall.  Per wife, pt was ambulatory with rollator walker in the home and for short distances in the community. Pt attends a  every morning and then has a caregiver that drives him around and takes him to dinner every night. Pt and wife live in an apartment on first floor. Wife is interested in pursing STR with transition to LTC. Secondary to cognitive impairments, pt was unable to follow commands for formal testing of strength, coordination or sensation. Wife was unable to remember which side was weaker from old CVA. Pt assisted to sitting edge of bed with maximum assist. Sitting balance was fair. Pt then performed sit to stand to rollator with moderate assist. Pt stood approximately  1 minute and then was fatigued and returned to supine. Standing posture was very flexed at trunk, knees and cervical spine. Significant departure from baseline which wife describes as being able to ambulate during a festival in [facility] just a month ago. Skilled PT warranted and additional therapy would certainly be beneficial following discharge from acute care. This section established at most recent assessment PROBLEM LIST (Impairments causing functional limitations): 1. Decreased Strength 2. Decreased ADL/Functional Activities 3. Decreased Transfer Abilities 4. Decreased Ambulation Ability/Technique 5. Decreased Balance 6. Decreased Activity Tolerance 7. Decreased Flexibility/Joint Mobility 8. Decreased [place] with Home Exercise Program 
9. Decreased Cognition INTERVENTIONS PLANNED: (Benefits and precautions of physical therapy have been discussed with the patient.) 1. Balance Exercise 2. Bed Mobility 3. Family Education 4. Gait Training 5. Home Exercise Program (HEP) 6. Therapeutic Activites 7. Therapeutic Exercise/Strengthening 8. Transfer Training 9. Group Therapy TREATMENT PLAN: Frequency/Duration: 3 times a week for duration of hospital stayRehabilitation Potential For Stated Goals: Good RECOMMENDED REHABILITATION/EQUIPMENT: (at time of discharge pending progress): Due to the probability of continued deficits (see above) this patient will likely need continued skilled physical therapy after discharge. Equipment:  
? None at this time HISTORY:  
History of Present Injury/Illness (Reason for Referral): 
Per chart: Pt is a 74 yo male with pmh dementia with behavioral disturbances, HTN, CVA, CAD who has had multiple ER visits this year. He presented to ER today via EMS due to his wife unable to pick him up off floor after fall at home. Pt provides no history of event due to dementia. Wife reports that pt fell around 0230 and she wasn't able to get him up until a family member came to visit hours later, when EMS was eventually called. Pt drowsy on arrival.  Lab work overall okay except CK elevated. Vitals stable. CT head without acute findings. He denies pain at this time. Family is hopeful for d/c to STR due to pt's weakness and recent falls.  
  
Past Medical History/Comorbidities:  
Mr. Star Ferguson  has a past medical history of CAD (coronary artery disease); Chronic back pain; Dementia (10/12/2018); Hypertension; and Stroke [facility]). He also has no past medical history of Arthritis; Asthma; Autoimmune disease ([address] Cancer ([address] Chronic kidney disease; COPD; DEMENTIA; Diabetes ([address] Endocrine disease; Gastrointestinal disorder; Heart failure ([address] Hypertension; Infectious disease; Liver disease; Other ill-defined conditions(799.89); Psychiatric disorder; PUD (peptic ulcer disease); Seizures ([address] Sleep disorder; or Thromboembolus ([address] Mr. Star Ferguson  has a past surgical history that includes hx heent. Social History/Living Environment:  
Home Environment: Private residence # Steps to Enter: 0 Wheelchair Ramp: No 
One/Two Story Residence: One story Living Alone: No 
Support Systems: Spouse/Significant Other/Partner Patient Expects to be Discharged to[de-identified] Rehabilitation facility Current DME Used/Available at Home: Therese Hoit, straight, Meaghan Smart, rollator Tub or Shower Type: Shower Prior Level of Function/Work/Activity: 
Pt required constant supervision but per wife, pt was able to ambulate with rollator in apartment and in community with only SBA. Pt states there have been no other falls. Pt requires assistance with ADLs. Personal Factors:   
      Sex:  male Age:  75 y.o. Number of Personal Factors/Comorbidities that affect the Plan of Care: 1-2: MODERATE COMPLEXITY EXAMINATION:  
Most Recent Physical Functioning:  
Gross Assessment: 
AROM: Generally decreased, functional (pt unable to follow commands for formal testing) Strength: Generally decreased, functional (pt unable to follow commands for formal testing) Coordination: Generally decreased, functional (pt unable to follow commands for formal testing) Sensation: Intact (pt unable to follow commands for formal testing) Posture: 
Posture (WDL): Exceptions to [facility] Posture Assessment: Forward head, Rounded shoulders Balance: 
Sitting: Impaired Sitting - Static: Fair (occasional) Sitting - Dynamic: Fair (occasional) Standing: Impaired Standing - Static: Poor Standing - Dynamic : Poor Bed Mobility: 
Rolling: Moderate assistance Supine to Sit: Maximum assistance Scooting: Total assistance Interventions: Safety awareness training; Tactile cues; Verbal cues Wheelchair Mobility: 
  
Transfers: 
Sit to Stand: Moderate assistance Stand to Sit: Minimum assistance Interventions: Verbal cues; Tactile cues; Safety awareness training Gait: 
  
   
  
Body Structures Involved: 1. Bones 2. Joints 3. Muscles Body Functions Affected: 1. Neuromusculoskeletal 
2. Movement Related Activities and Participation Affected: 1. General Tasks and Demands 2. Mobility 3. Self Care Number of elements that affect the Plan of Care: 4+: HIGH COMPLEXITY CLINICAL PRESENTATION:  
 Presentation: Stable and uncomplicated: LOW COMPLEXITY CLINICAL DECISION MAKING:  
[facility] AM-PAC 6 Clicks Basic Mobility Inpatient Short Form How much difficulty does the patient currently have. .. Unable A Lot A Little None 1. Turning over in bed (including adjusting bedclothes, sheets and blankets)? [] 1   [x] 2   [] 3   [] 4  
2. Sitting down on and standing up from a chair with arms ( e.g., wheelchair, bedside commode, etc.)   [] 1   [x] 2   [] 3   [] 4  
3. Moving from lying on back to sitting on the side of the bed? [] 1   [x] 2   [] 3   [] 4 How much help from another person does the patient currently need. .. Total A Lot A Little None 4. Moving to and from a bed to a chair (including a wheelchair)? [] 1   [x] 2   [] 3   [] 4  
5. Need to walk in hospital room? [x] 1   [] 2   [] 3   [] 4  
6. Climbing 3-5 steps with a railing? [x] 1   [] 2   [] 3   [] 4  
© 2007, Trustees of [facility], under license to [employer]. All rights reserved Score:  Initial: 10 Most Recent: X (Date: -- ) Interpretation of Tool:  Represents activities that are increasingly more difficult (i.e. Bed mobility, Transfers, Gait). Score 24 23 22-20 19-15 14-10 9-7 6 Modifier CH CI CJ CK CL CM CN   
 
? Mobility - Walking and Moving Around:  
  - CURRENT STATUS: CL - 60%-79% impaired, limited or restricted  - GOAL STATUS: CL - 60%-79% impaired, limited or restricted  - D/C STATUS:  ---------------To be determined--------------- Payor: [facility] MEDICARE / Plan: [facility] MEDICARE HMO/PPO / Product Type: Managed Care Medicare /   
 
Medical Necessity:    
· Patient demonstrates fair rehab potential due to higher previous functional level. Reason for Services/Other Comments: 
· Patient continues to require present interventions due to patient's inability to function at baseline. Use of outcome tool(s) and clinical judgement create a POC that gives a: Questionable prediction of patient's progress: MODERATE COMPLEXITY  
  
 
 
 
TREATMENT:  
(In addition to Assessment/Re-Assessment sessions the following treatments were rendered) Pre-treatment Symptoms/Complaints:  Pt did not make many verbalizations. He did say thank you when therapist left. Pain: Initial:  
Pain Intensity 1: 0  Post Session:  0/10 Therapeutic Activity: (    25 minutes): Therapeutic activities including Bed transfers and sitting/standing balance activities to improve mobility, balance, coordination and tolerance for physical activity. Required moderate verbal cues   to promote functional independence. Braces/Orthotics/Lines/Etc:  
· IV 
· O2 Device: Room air Treatment/Session Assessment:   
· Response to Treatment:  Tolerated without difficulty. · Interdisciplinary Collaboration:  
o Physical Therapist 
o Registered Nurse · After treatment position/precautions:  
o Supine in bed 
o Bed/Chair-wheels locked 
o Bed in low position 
o Call light within reach 
o RN notified 
o Family at bedside 
o deroyal alarm in place and ON · Compliance with Program/Exercises: Will assess as treatment progresses. · Recommendations/Intent for next treatment session: \"Next visit will focus on advancements to more challenging activities and reduction in assistance provided\". Total Treatment Duration: PT Patient Time In/Time Out Time In: 7535 Time Out: 1382 Jeb Mcarthur PT

## 2018-10-13 NOTE — PROGRESS NOTES
Problem: Falls - Risk of 
Goal: *Absence of Falls Document Brian Campo Fall Risk and appropriate interventions in the flowsheet. Outcome: Progressing Towards Goal 
Fall Risk Interventions: 
Mobility Interventions: Bed/chair exit alarm, OT consult for ADLs, PT Consult for mobility concerns, PT Consult for assist device competence, Strengthening exercises (ROM-active/passive), Utilize walker, cane, or other assistive device Mentation Interventions: Adequate sleep, hydration, pain control, Bed/chair exit alarm, Door open when patient unattended, Increase mobility, More frequent rounding, Reorient patient, Room close to nurse's station, Toileting rounds, Update white board Medication Interventions: Bed/chair exit alarm Elimination Interventions: Bed/chair exit alarm, Call light in reach, Patient to call for help with toileting needs, Toilet paper/wipes in reach History of Falls Interventions: Bed/chair exit alarm, Door open when patient unattended Problem: Pressure Injury - Risk of 
Goal: *Prevention of pressure injury Document Ha Scale and appropriate interventions in the flowsheet. Outcome: Progressing Towards Goal 
Pressure Injury Interventions: 
Sensory Interventions: Assess changes in LOC, Check visual cues for pain, Discuss PT/OT consult with provider, Keep linens dry and wrinkle-free, Maintain/enhance activity level, Minimize linen layers, Monitor skin under medical devices, Pad between skin to skin, Pressure redistribution bed/mattress (bed type), Sit a 90-degree angle/use footstool if needed Moisture Interventions: Absorbent underpads, Apply protective barrier, creams and emollients, Limit adult briefs, Maintain skin hydration (lotion/cream), Minimize layers, Moisture barrier Activity Interventions: Increase time out of bed, Pressure redistribution bed/mattress(bed type), PT/OT evaluation Mobility Interventions: HOB 30 degrees or less, Pressure redistribution bed/mattress (bed type), PT/OT evaluation Nutrition Interventions: Document food/fluid/supplement intake

## 2018-10-13 NOTE — PROGRESS NOTES
CM met with patient's spouse in room to assist with discharge planning needs for STR to LTC per consult. SOCIAL: 
Prior to admission patient has been living with wife in senior living apartment on ground level. 0 step entry; bathtub with built in bench and grab bars. Wife is unable to physically lift patient. Patient has multiple children who live locally. Additional support available from spouse's family. Patient is not a . Insurance is confirmed as listed (Nisha Denissell as primary and Medicaid as supplemental). Insurance covers cost of RX. No HCPOA. PCP is Dr. Rl Galvin at [facility]. Last visit 3-4 months ago. MOBILITY / HISTORY: At baseline patient is able to ambulate with a rollator. Wife typically assists him with all ADLs due to limitations related to dementia, per wife. Patient does not drive. DME - rollator, cane No home oxygen. No dialysis history. No HH history. History of STR at [facility] in 2015. PLAN FOR DISCHARGE: 
Plan to DC to SNF for STR to LTC. Patient has Medicaid already in place for LTC payor source. PPD has been placed. PT/OT/ST consulted. Referrals placed to Ul[facility] Piedra, and RN liaison, Nas Jordan, for [facility]. Awaiting response regarding bed availability for STR to LTC. CM will continue to follow. Care Management Interventions PCP Verified by CM: Yes (Dr. Rl Galvin at [facility]) Mode of Transport at Discharge: BLS (florian if going to [facility]) Transition of Care Consult (CM Consult): Discharge Planning, SNF (Anticipate STR to LTC) Discharge Durable Medical Equipment: No (To be ordered at [facility]) Physical Therapy Consult: Yes Occupational Therapy Consult: Yes Speech Therapy Consult: Yes Current Support Network: Lives with Spouse, Family Lives Nearby (Living in [address] [facility] apartment with wife. ) Confirm Follow Up Transport: Family Plan discussed with Pt/Family/Caregiver: Yes Freedom of Choice Offered: Yes Discharge Location Discharge Placement: Rehab Unit Subacute

## 2018-10-13 NOTE — PROGRESS NOTES
Problem: Falls - Risk of 
Goal: *Absence of Falls Document Lavellekevin Desirh Fall Risk and appropriate interventions in the flowsheet. Outcome: Progressing Towards Goal 
Fall Risk Interventions: 
Mobility Interventions: Bed/chair exit alarm, Communicate number of staff needed for ambulation/transfer, OT consult for ADLs, Patient to call before getting OOB, PT Consult for mobility concerns Mentation Interventions: Adequate sleep, hydration, pain control, Bed/chair exit alarm, Door open when patient unattended, Increase mobility, Reorient patient, More frequent rounding, Toileting rounds Medication Interventions: Bed/chair exit alarm, Patient to call before getting OOB, Teach patient to arise slowly Elimination Interventions: Bed/chair exit alarm, Call light in reach, Patient to call for help with toileting needs, Toilet paper/wipes in reach, Toileting schedule/hourly rounds History of Falls Interventions: Room close to nurse's station Problem: Pressure Injury - Risk of 
Goal: *Prevention of pressure injury Document Ha Scale and appropriate interventions in the flowsheet. Outcome: Progressing Towards Goal 
Pressure Injury Interventions: 
Sensory Interventions: Assess changes in LOC, Avoid rigorous massage over bony prominences, Discuss PT/OT consult with provider, Check visual cues for pain, Keep linens dry and wrinkle-free, Maintain/enhance activity level, Minimize linen layers, Pressure redistribution bed/mattress (bed type) Moisture Interventions: Absorbent underpads, Apply protective barrier, creams and emollients, Check for incontinence Q2 hours and as needed, Minimize layers Activity Interventions: Increase time out of bed, PT/OT evaluation, Pressure redistribution bed/mattress(bed type) Mobility Interventions: HOB 30 degrees or less, Pressure redistribution bed/mattress (bed type), PT/OT evaluation Nutrition Interventions: Document food/fluid/supplement intake

## 2018-10-13 NOTE — PROGRESS NOTES
Problem: Dysphagia (Adult) Goal: *Acute Goals and Plan of Care (Insert Text) ST. Pt. Will tolerate puree/thin liquids with no overt s/sx of aspiration/penetration. 2. Patient will tolerate trial upgrades with SLP with adequate oral clearance with 80% accuracy. LTG: Pt. Will tolerate least restrictive diet without respiratory decline. Speech language pathology: observation bedside swallow note: Initial Assessment NAME/AGE/GENDER: Aruna Fajardo is a 75 y.o. male DATE: 10/13/2018 PRIMARY DIAGNOSIS: Recurrent falls ICD-10: Treatment Diagnosis: R13.12 Dysphagia, Oropharyngeal Phase INTERDISCIPLINARY COLLABORATION: Registered Nurse PRECAUTIONS/ALLERGIES: Pcn [penicillins] and Pcn [penicillins] ASSESSMENT:Based on the objective data described below, Mr. Zaira Chanel presents with pocketing with AM meal and difficulty with self feeding. Unclear visual acuity and patient unable to provide baseline. Moderate food in oral cavity with max cues to clear. Tolerated thin liquids via cup and straw sips without overt s/sx. Tolerated applesauce with min oral residue. Mod residue with fruit, poor awareness - pocketing on right. Max cues to clear. Given pocketing with solids, cracker deferred. Recommend puree, thin liquids with medication as tolerated (check for pocketing if provided whole). Follow for solid trials as alertness increases. Patient will benefit from skilled intervention to address the below impairments. ?????? ? ? This section established at most recent assessment?????????? 
PROBLEM LIST (Impairments causing functional limitations): 1. Dysphagia/pocketing REHABILITATION POTENTIAL FOR STATED GOALS: Fair PLAN OF CARE:  
Patient will benefit from skilled intervention to address the following impairments. RECOMMENDATIONS AND PLANNED INTERVENTIONS (Benefits and precautions of therapy have been discussed with the patient.): 
· PO:  Pureed · Liquids:  regular thin MEDICATIONS: 
· Crushed in puree · With liquid · as tolerated COMPENSATORY STRATEGIES/MODIFICATIONS INCLUDING: 
· Alternate liquids/solids · Small sips and bites OTHER RECOMMENDATIONS (including follow up treatment recommendations):  
· Patient education RECOMMENDED DIET MODIFICATIONS DISCUSSED WITH: 
· Nursing · Patient FREQUENCY/DURATION: Continue to follow patient 3 times a week for duration of hospital stay to address above goals. RECOMMENDED REHABILITATION/EQUIPMENT: (at time of discharge pending progress): Due to the probability of continued deficits (see above) this patient will likely need continued skilled speech therapy after discharge. SUBJECTIVE:  
Drowsy, lethargic. Max cues to open eyes. History of Present Injury/Illness: Mr. Fay Lu  has a past medical history of CAD (coronary artery disease); Chronic back pain; Dementia (10/12/2018); Hypertension; and Stroke [facility]). He also has no past medical history of Arthritis; Asthma; Autoimmune disease ([address] Cancer ([facility] [address] [address] Chronic kidney disease; COPD; DEMENTIA; Diabetes ([facility] [address] Endocrine disease; Gastrointestinal disorder; Heart failure ([address] Hypertension; Infectious disease; Liver disease; Other ill-defined conditions(799.89); Psychiatric disorder; PUD (peptic ulcer disease); Seizures ([facility] [address] Sleep disorder; or Thromboembolus ([facility] [address] .  He also  has a past surgical history that includes hx heent. Present Symptoms:   
Pain Intensity 1: 0 Current Medications: No current facility-administered medications on file prior to encounter. Current Outpatient Prescriptions on File Prior to Encounter Medication Sig Dispense Refill  omeprazole (PRILOSEC OTC) 20 mg tablet Take 20 mg by mouth daily. 31 Tab 2  
 memantine (NAMENDA) 10 mg tablet Take 10 mg by mouth daily.  amLODIPine (NORVASC) 10 mg tablet Take 5 mg by mouth daily. Current Dietary Status:   
 Regular/thin History of reflux:  YES  
? Reflux medication:prilosec Social History/Home Situation: Home Environment: Private residence # Steps to Enter: 0 One/Two Story Residence: One story Living Alone: No 
Support Systems: Spouse/Significant Other/Partner Patient Expects to be Discharged to[de-identified] Unknown Current DME Used/Available at Home: [employer], [address], rollator OBJECTIVE:  
Respiratory Status: CXR Results:n/a 
CT Results:Impression: Chronic appearing changes. Oral Motor Structure/Speech:  Oral-Motor Structure/Motor Speech Labial: Decreased rate, Decreased seal 
Dentition: Limited Oral Hygiene:  (food residue from AM meal) Lingual: Decreased rate, Decreased strength Cognitive and Communication Status: 
Neurologic State: Confused;Drowsy Orientation Level: Oriented to person Cognition: Decreased attention/concentration;Decreased command following Perception: Verbal;Visual (?visual acuity/function) Safety/Judgement: Decreased insight into deficits BEDSIDE SWALLOW EVALUATIONOral Assessment: 
Oral Assessment Labial: Decreased rate;Decreased seal 
Dentition: Limited Oral Hygiene:  (food residue from AM meal) Lingual: Decreased rate;Decreased strength P.O. Trials: 
Patient Position: upright in bed The patient was given bite/sip amounts of the following:  
Consistency Presented: Thin liquid; Solid;Puree;Mixed consistency; Ice chips How Presented: SLP-fed/presented;Cup/gulp;Cup/sip;Spoon;Straw;Successive swallows ORAL PHASE: 
Bolus Acceptance: Impaired Bolus Formation/Control: Impaired Propulsion: Delayed (# of seconds); Discoordination Type of Impairment: Delayed;Mastication;Piecemeal 
Oral Residue: Greater than 50% of bolus PHARYNGEAL PHASE: 
Initiation of Swallow: Delayed (# of seconds) Laryngeal Elevation: Functional 
Aspiration Signs/Symptoms: None Vocal Quality: No impairment Effective Modifications: None Pharyngeal Phase Characteristics: No impairment, issues, or problems OTHER OBSERVATIONS: 
Rate/bite size: Impaired Endurance:  Impaired and Questionable Comments: 1:1 assist/supervision Tool Used: Dysphagia Outcome and Severity Scale (SHABANA) Score Comments Normal Diet  [] 7 With no strategies or extra time needed Functional Swallow  [] 6 May have mild oral or pharyngeal delay Mild Dysphagia 
  [] 5 Which may require one diet consistency restricted (those who demonstrate penetration which is entirely cleared on MBS would be included) Mild-Moderate Dysphagia  [] 4 With 1-2 diet consistencies restricted Moderate Dysphagia  [x] 3 With 2 or more diet consistencies restricted Moderately Severe Dysphagia  [] 2 With partial PO strategies (trials with ST only) Severe Dysphagia  [] 1 With inability to tolerate any PO safely Score:  Initial: 3 Most Recent: X (Date: -- ) Interpretation of Tool: The Dysphagia Outcome and Severity Scale (SHABANA) is a simple, easy-to-use, 7-point scale developed to systematically rate the functional severity of dysphagia based on objective assessment and make recommendations for diet level, independence level, and type of nutrition. Score 7 6 5 4 3 2 1 Modifier CH CI CJ CK CL CM CN ? Swallowing:  
  - CURRENT STATUS: CL - 60%-79% impaired, limited or restricted  - GOAL STATUS:  CJ - 20%-39% impaired, limited or restricted  - D/C STATUS:  ---------------To be determined--------------- Payor: [facility] [place] MEDICARE / Plan[place] [employer] [place] / Product Type: Managed Care Medicare /  
 
TREATMENT:  
 (In addition to Assessment/Re-Assessment sessions the following treatments were rendered) Assessment/Reassessment only, no treatment provided today MODALITIES:  
  
  
  
  
  
  
  
  
  
  
    
  
  
  
  
  
  
  
  
   
 
ORAL MOTOR  EXERCISES: 
  
  
  
  
  
  
  
  
  
  
  
  
  
  
  
  
  
  
  
  
  
  
  
  
  
  
    
  
  
  
  
  
  
  
  
  
  
  
  
  
  
  
  
  
  
  
  
  
  
  
  
  
   
 
 LARYNGEAL / PHARYNGEAL EXERCISES: 
  
  
  
  
  
  
  
  
  
  
  
  
  
  
  
  
  
  
  
  
  
    
  
  
  
  
  
  
  
  
  
  
  
  
  
  
  
  
  
  
  
   
 
__________________________________________________________________________________________________ Safety: After treatment position/precautions: 
· Call light within reach · RN notified · Upright in Bed Treatment Assessment:  Patient required mod cues to participate in evaluation. Progression/Medical Necessity:  
· Skilled intervention continues to be required due to patient still consuming a modified diet. Compliance with Program/Exercises: Will assess as treatment progresses. Reason for Continuation of Services/Other Comments: 
· Patient continues to require skilled intervention due to medical complications and patient unable to attend/participate in therapy as expected. Recommendations/Intent for next treatment session: \"Treatment next visit will focus on advancements to more challenging activities and reduction in assistance provided\". Trials of solids. Total Treatment Duration: 
Time In: 3638 Time Out: 1041 Diego Rodríguez.  Neha, MS, CCC-SLP

## 2018-10-13 NOTE — PROGRESS NOTES
fall occurred while walking per notes Yesy Banegas, staff [address], [address] [facility] [address]  /   [email or web address]

## 2018-10-13 NOTE — PROGRESS NOTES
1. Patient will complete upper body bathing and dressing with mod A and adaptive equipment as needed. 2. Patient will complete toileting with min A.  
3. Patient will tolerate 23 minutes of OT treatment with 2-3 rest breaks to increase activity tolerance for ADLs. 4. Patient will complete functional transfers with min A and adaptive equipment as needed. 5. Patient will complete self-grooming with supervision after set-up. Timeframe: 7 visits OCCUPATIONAL THERAPY: Initial Assessment and AM 10/13/2018OBSERVATION: Hospital Day: 2 Payor: [facility] [place] MEDICARE / Plan: Ron[facility] [place] / Product Type: Managed Care Medicare /  
  
NAME/AGE/GENDER: Naresh Christie is a 75 y.o. male PRIMARY DIAGNOSIS:  Recurrent falls Non-traumatic rhabdomyolysis Non-traumatic rhabdomyolysis ICD-10: Treatment Diagnosis:  
 · Generalized Muscle Weakness (M62.81) · Other lack of cordination (R27.8) · History of falling (Z91.81) Precautions/Allergies: 
   Pcn [penicillins] and Pcn [penicillins] ASSESSMENT:  
Mr. Tevin Narayanan presents for the above. Upon arrival, pt supine in bed and agreeable to OT evaluation. Pt is alert and oriented only to person. Pt presents with confusion and hx of dementia, therefore unable to obtain accurate background. Per chart, pt lives with wife in a 1-story home; pt has recently become unsteady with recurrent falls. At baseline, pt required assistance with ADLs and was completing functional mobility with use of RW. Today, pt required max A for rolling bed mobility and scooting. Pt's BUE AROM and strength are generally decreased but within functional limits. Pt left supine in bed with needs met, call light within reach, and RN notified. At this time, Mr. Tevin Narayanan is functioning below baseline for ADLs and functional mobility. Pt would continue to benefit from skilled OT services. This section established at most recent assessment PROBLEM LIST (Impairments causing functional limitations): 1. Decreased Strength 2. Decreased ADL/Functional Activities 3. Decreased Transfer Abilities 4. Decreased Ambulation Ability/Technique 5. Decreased Balance 6. Decreased Activity Tolerance 7. Decreased Cognition INTERVENTIONS PLANNED: (Benefits and precautions of occupational therapy have been discussed with the patient.) 1. Activities of daily living training 2. Adaptive equipment training 3. Balance training 4. Clothing management 5. Cognitive training 6. Community reintergration 7. Donning&doffing training 8. Re-evaluation 9. Therapeutic activity 10. Therapeutic exercise TREATMENT PLAN: Frequency/Duration: Follow patient 3x/week to address above goals. Rehabilitation Potential For Stated Goals: Fair RECOMMENDED REHABILITATION/EQUIPMENT: (at time of discharge pending progress): Due to the probability of continued deficits (see above) this patient will likely need continued skilled occupational therapy after discharge. Equipment: ? TBD  
    
 
 
 
OCCUPATIONAL PROFILE AND HISTORY:  
History of Present Injury/Illness (Reason for Referral): 
See h&P Past Medical History/Comorbidities:  
Mr. Roxana Eid  has a past medical history of CAD (coronary artery disease); Chronic back pain; Dementia (10/12/2018); Hypertension; and Stroke [facility]). He also has no past medical history of Arthritis; Asthma; Autoimmune disease ([address] Cancer ([address] Chronic kidney disease; COPD; DEMENTIA; Diabetes ([address] Endocrine disease; Gastrointestinal disorder; Heart failure ([address] Hypertension; Infectious disease; Liver disease; Other ill-defined conditions(799.89); Psychiatric disorder; PUD (peptic ulcer disease); Seizures ([address] Sleep disorder; or Thromboembolus ([address] Mr. Roxana Eid  has a past surgical history that includes hx heent. Social History/Living Environment:  
Home Environment: Private residence # Steps to Enter: 0 One/Two Story Residence: One story Living Alone: No 
Support Systems: Spouse/Significant Other/Partner Patient Expects to be Discharged to[de-identified] Rehabilitation facility Current DME Used/Available at Home: [employer]ined[employer] Jacinta Simper, rollator Prior Level of Function/Work/Activity: 
Assistance with ADls; use of RW for functional mobility. Personal Factors:   
      Sex:  male Age:  75 y.o. Past/Current Experience: Altered mental status, hx of falls Other factors that influence how disability is experienced by the patient:  Multiple co-morbidities Number of Personal Factors/Comorbidities that affect the Plan of Care: Expanded review of therapy/medical records (1-2):  MODERATE COMPLEXITY ASSESSMENT OF OCCUPATIONAL PERFORMANCE[de-identified]  
Activities of Daily Living:  
Basic ADLs (From Assessment) Complex ADLs (From Assessment) Feeding: Minimum assistance Oral Facial Hygiene/Grooming: Minimum assistance Bathing: Maximum assistance Upper Body Dressing: Minimum assistance Lower Body Dressing: Maximum assistance Toileting: Maximum assistance Instrumental ADL Meal Preparation: Total assistance Homemaking: Total assistance Grooming/Bathing/Dressing Activities of Daily Living Cognitive Retraining Safety/Judgement: Decreased awareness of environment;Decreased awareness of need for assistance;Decreased awareness of need for safety;Decreased insight into deficits Bed/Mat Mobility Rolling: Maximum assistance Scooting: Maximum assistance Most Recent Physical Functioning:  
Gross Assessment: 
  
         
  
Posture: 
  
Balance: 
  Bed Mobility: 
Rolling: Maximum assistance Scooting: Maximum assistance Wheelchair Mobility: 
  
Transfers: 
   
 
    
 
Patient Vitals for the past 6 hrs: 
 BP BP Patient Position SpO2 Pulse 10/13/18 0728 135/83 At rest 97 % 74  
10/13/18 1105 132/88 At rest 99 % 65 Mental Status Neurologic State: Confused Orientation Level: Oriented to person, Disoriented to time, Disoriented to situation, Disoriented to place Cognition: Decreased command following, Decreased attention/concentration Perception: Verbal, Visual (?visual acuity/function) Safety/Judgement: Decreased awareness of environment, Decreased awareness of need for assistance, Decreased awareness of need for safety, Decreased insight into deficits Physical Skills Involved: 
1. Balance 2. Strength 3. Activity Tolerance 4. Gross Motor Control Cognitive Skills Affected (resulting in the inability to perform in a timely and safe manner): 1. Perception 2. Executive Function 3. Immediate Memory 4. Short Term Recall 5. Long Term Memory 6. Sustained Attention 7. Divided Attention 8. Comprehension 9. Expression Psychosocial Skills Affected: 1. Habits/Routines 2. Environmental Adaptation Number of elements that affect the Plan of Care: 5+:  HIGH COMPLEXITY CLINICAL DECISION MAKIN[address] [place] [address] AM-PAC 6 Clicks Daily Activity Inpatient Short Form How much help from another person does the patient currently need. .. Total A Lot A Little None 1. Putting on and taking off regular lower body clothing? [] 1   [x] 2   [] 3   [] 4  
2. Bathing (including washing, rinsing, drying)? [] 1   [x] 2   [] 3   [] 4  
3. Toileting, which includes using toilet, bedpan or urinal?   [] 1   [x] 2   [] 3   [] 4  
4. Putting on and taking off regular upper body clothing? [] 1   [] 2   [x] 3   [] 4  
5. Taking care of personal grooming such as brushing teeth? [] 1   [] 2   [x] 3   [] 4  
6. Eating meals? [] 1   [] 2   [x] 3   [] 4  
© 2007, Trustees of [address] [place] [address], under license to [employer]. All rights reserved Score:  Initial: 15 Most Recent: X (Date: -- ) Interpretation of Tool:  Represents activities that are increasingly more difficult (i.e. Bed mobility, Transfers, Gait). Score 24 23 22-20 19-15 14-10 9-7 6 Modifier CH CI CJ CK CL CM CN   
 
? Self Care:  
  - CURRENT STATUS: CK - 40%-59% impaired, limited or restricted  - GOAL STATUS: CJ - 20%-39% impaired, limited or restricted  - D/C STATUS:  ---------------To be determined--------------- Payor: [facility] [place] / Plan: [place] [facility] MEDIC[facility] HMO/PPO / Product Type: Managed Care Medicare /   
 
Medical Necessity:    
· Patient is expected to demonstrate progress in strength, balance, coordination and functional technique to decrease assistance required with ADLs and functional mobility. Roshan Cool Reason for Services/Other Comments: 
· Patient continues to require skilled intervention due to medical complications and patient unable to attend/participate in therapy as expected. Use of outcome tool(s) and clinical judgement create a POC that gives a: MODERATE COMPLEXITY  
 
 
 
TREATMENT:  
(In addition to Assessment/Re-Assessment sessions the following treatments were rendered) Pre-treatment Symptoms/Complaints:  No complaints Pain: Initial:  
Pain Intensity 1: 0 /10 Post Session:  same Assessment/Reassessment only, no treatment provided today Braces/Orthotics/Lines/Etc:  
· IV 
· O2 Device: Room air Treatment/Session Assessment:   
· Response to Treatment:  eval only. · Interdisciplinary Collaboration:  
o Occupational Therapist 
o Registered Nurse · After treatment position/precautions:  
o Supine in bed 
o Bed alarm/tab alert on 
o Bed/Chair-wheels locked 
o Call light within reach 
o RN notified · Compliance with Program/Exercises: Will assess as treatment progresses. · Recommendations/Intent for next treatment session: \"Next visit will focus on advancements to more challenging activities and reduction in assistance provided\". Total Treatment Duration: OT Patient Time In/Time Out Time In: 1128 Time Out: 1136 Eunice Lawrence OT

## 2018-10-13 NOTE — PROGRESS NOTES
Progress Note Patient: Mamadou Moy MRN: [id number]  SSN: [id number] YOB: 1945  Age: 75 y.o. Sex: male Admit Date: 10/12/2018 LOS: 0 days Subjective:  
 
Pt admitted yesterday after fall at home with prolonged time on floor. Currently no family at bedside and pt with dementia, so history unreliable. He states he fell two weeks ago as opposed to two days ago. He denies pain. He has no complaints at this time. Objective:  
 
Vitals:  
 10/12/18 2309 10/13/18 0309 10/13/18 0728 10/13/18 1105 BP: 129/90 (!) 142/93 135/83 132/88 Pulse: 77 82 74 65 Resp: 17 17 18 17 Temp: 98.6 °F (37 °C) 98.4 °F (36.9 °C) 98.5 °F (36.9 °C) 97.7 °F (36.5 °C) SpO2: 98% 97% 97% 99% Weight:      
Height:      
  
 
Intake and Output: 
Current Shift:   
Last three shifts:   
 
Physical Exam:  
GENERAL: alert, cooperative, no distress, slowed mentation LUNG: clear to auscultation bilaterally HEART: regular rate and rhythm, S1, S2 normal, no murmur, click, rub or gallop ABDOMEN: soft, non-tender. Bowel sounds normal. No masses,  no organomegaly EXTREMITIES:  extremities normal, atraumatic, no cyanosis or edema Lab/Data Review: 
CMP:  
Lab Results Component Value Date/Time  10/13/2018 05:04 AM  
 K 3.6 10/13/2018 05:04 AM  
  (H) 10/13/2018 05:04 AM  
 CO2 27 10/13/2018 05:04 AM  
 AGAP 6 (L) 10/13/2018 05:04 AM  
 GLU 74 10/13/2018 05:04 AM  
 BUN 12 10/13/2018 05:04 AM  
 CREA 1.49 10/13/2018 05:04 AM  
 GFRAA 59 (L) 10/13/2018 05:04 AM  
 GFRNA 49 (L) 10/13/2018 05:04 AM  
 CA 8.2 (L) 10/13/2018 05:04 AM  
 
All Cardiac Markers in the last 24 hours:  
Lab Results Component Value Date/Time CPK 3786 (H) 10/13/2018 05:04 AM  
  
 
 
Assessment:  
 
Principal Problem: 
  Non-traumatic rhabdomyolysis (10/13/2018) Active Problems: 
  Recurrent falls (10/12/2018) Dementia (10/12/2018) Plan:  
 
Principal Problem: Non-traumatic rhabdomyolysis (10/13/2018) - CK increasing, will increase rate of fluids. Repeat CK and BMP in AM. Encourage oral fluid intake. Active Problems: 
  Recurrent falls (10/12/2018)- PT/OT, will need SNF Dementia (10/12/2018) - poor insight to condition Dispo: Will likely need placement once CK normalizes. DVT ppx: heparin [place] Signed By: CHENG Bowden October 13, 2018

## 2018-10-14 ENCOUNTER — APPOINTMENT (OUTPATIENT)
Dept: GENERAL RADIOLOGY | Age: 73
DRG: 565 | End: 2018-10-14
Payer: MEDICARE

## 2018-10-14 LAB
ANION GAP SERPL CALC-SCNC: 10 MMOL/L (ref 7–16)
BUN SERPL-MCNC: 9 MG/DL (ref 8–23)
CALCIUM SERPL-MCNC: 8.3 MG/DL (ref 8.3–10.4)
CHLORIDE SERPL-SCNC: 110 MMOL/L (ref 98–107)
CK SERPL-CCNC: 2344 U/L (ref 21–215)
CO2 SERPL-SCNC: 24 MMOL/L (ref 21–32)
CREAT SERPL-MCNC: 1.28 MG/DL (ref 0.8–1.5)
GLUCOSE BLD STRIP.AUTO-MCNC: 106 MG/DL (ref 65–100)
GLUCOSE BLD STRIP.AUTO-MCNC: 137 MG/DL (ref 65–100)
GLUCOSE BLD STRIP.AUTO-MCNC: 69 MG/DL (ref 65–100)
GLUCOSE BLD STRIP.AUTO-MCNC: 78 MG/DL (ref 65–100)
GLUCOSE BLD STRIP.AUTO-MCNC: 83 MG/DL (ref 65–100)
GLUCOSE BLD STRIP.AUTO-MCNC: 93 MG/DL (ref 65–100)
GLUCOSE SERPL-MCNC: 79 MG/DL (ref 65–100)
MM INDURATION POC: NORMAL MM (ref 0–5)
POTASSIUM SERPL-SCNC: 3.4 MMOL/L (ref 3.5–5.1)
PPD POC: NORMAL NEGATIVE
SODIUM SERPL-SCNC: 144 MMOL/L (ref 136–145)

## 2018-10-14 PROCEDURE — 74011250637 HC RX REV CODE- 250/637: Performed by: INTERNAL MEDICINE

## 2018-10-14 PROCEDURE — 74011250636 HC RX REV CODE- 250/636: Performed by: INTERNAL MEDICINE

## 2018-10-14 PROCEDURE — 80048 BASIC METABOLIC PNL TOTAL CA: CPT

## 2018-10-14 PROCEDURE — 77030020263 HC SOL INJ SOD CL0.9% LFCR 1000ML

## 2018-10-14 PROCEDURE — 71045 X-RAY EXAM CHEST 1 VIEW: CPT

## 2018-10-14 PROCEDURE — 74011250637 HC RX REV CODE- 250/637: Performed by: NURSE PRACTITIONER

## 2018-10-14 PROCEDURE — 74011000250 HC RX REV CODE- 250: Performed by: INTERNAL MEDICINE

## 2018-10-14 PROCEDURE — 36415 COLL VENOUS BLD VENIPUNCTURE: CPT

## 2018-10-14 PROCEDURE — 82550 ASSAY OF CK (CPK): CPT

## 2018-10-14 PROCEDURE — 65660000000 HC RM CCU STEPDOWN

## 2018-10-14 RX ADMIN — HEPARIN SODIUM 5000 UNITS: 5000 INJECTION INTRAVENOUS; SUBCUTANEOUS at 13:55

## 2018-10-14 RX ADMIN — SODIUM CHLORIDE 150 ML/HR: 900 INJECTION, SOLUTION INTRAVENOUS at 13:49

## 2018-10-14 RX ADMIN — DONEPEZIL HYDROCHLORIDE 10 MG: 5 TABLET, FILM COATED ORAL at 08:35

## 2018-10-14 RX ADMIN — TRAZODONE HYDROCHLORIDE 200 MG: 50 TABLET ORAL at 21:37

## 2018-10-14 RX ADMIN — HEPARIN SODIUM 5000 UNITS: 5000 INJECTION INTRAVENOUS; SUBCUTANEOUS at 05:07

## 2018-10-14 RX ADMIN — SODIUM CHLORIDE 150 ML/HR: 900 INJECTION, SOLUTION INTRAVENOUS at 05:08

## 2018-10-14 RX ADMIN — SODIUM CHLORIDE 150 ML/HR: 900 INJECTION, SOLUTION INTRAVENOUS at 21:38

## 2018-10-14 RX ADMIN — AMLODIPINE BESYLATE 10 MG: 10 TABLET ORAL at 08:36

## 2018-10-14 RX ADMIN — RISPERIDONE 2 MG: 1 TABLET ORAL at 21:38

## 2018-10-14 RX ADMIN — Medication 5 ML: at 21:38

## 2018-10-14 RX ADMIN — Medication 5 ML: at 05:08

## 2018-10-14 RX ADMIN — SENNOSIDES AND DOCUSATE SODIUM 2 TABLET: 8.6; 5 TABLET ORAL at 21:38

## 2018-10-14 RX ADMIN — Medication 10 ML: at 13:50

## 2018-10-14 RX ADMIN — MEMANTINE HYDROCHLORIDE 10 MG: 5 TABLET ORAL at 08:35

## 2018-10-14 RX ADMIN — POLYETHYLENE GLYCOL 3350 17 G: 17 POWDER, FOR SOLUTION ORAL at 08:36

## 2018-10-14 RX ADMIN — METOPROLOL TARTRATE 25 MG: 25 TABLET ORAL at 08:35

## 2018-10-14 RX ADMIN — HEPARIN SODIUM 5000 UNITS: 5000 INJECTION INTRAVENOUS; SUBCUTANEOUS at 21:38

## 2018-10-14 NOTE — PROGRESS NOTES
Problem: Falls - Risk of 
Goal: *Absence of Falls Document Ashley Deal Fall Risk and appropriate interventions in the flowsheet. Outcome: Progressing Towards Goal 
Fall Risk Interventions: 
Mobility Interventions: Bed/chair exit alarm, Communicate number of staff needed for ambulation/transfer Mentation Interventions: Bed/chair exit alarm, Door open when patient unattended, Adequate sleep, hydration, pain control, Toileting rounds, Reorient patient, More frequent rounding Medication Interventions: Bed/chair exit alarm, Evaluate medications/consider consulting pharmacy, Patient to call before getting OOB, Teach patient to arise slowly Elimination Interventions: Call light in reach, Bed/chair exit alarm, Elevated toilet seat, Patient to call for help with toileting needs, Toilet paper/wipes in reach, Toileting schedule/hourly rounds History of Falls Interventions: Evaluate medications/consider consulting pharmacy, Door open when patient unattended, Consult care management for discharge planning, Bed/chair exit alarm, Investigate reason for fall Problem: Pressure Injury - Risk of 
Goal: *Prevention of pressure injury Document Ha Scale and appropriate interventions in the flowsheet. Outcome: Progressing Towards Goal 
Pressure Injury Interventions: 
Sensory Interventions: Assess changes in LOC Moisture Interventions: Absorbent underpads, Apply protective barrier, creams and emollients, Assess need for specialty bed, Offer toileting Q_hr, Maintain skin hydration (lotion/cream) Activity Interventions: Increase time out of bed, Chair cushion, PT/OT evaluation, Pressure redistribution bed/mattress(bed type) Mobility Interventions: HOB 30 degrees or less, Pressure redistribution bed/mattress (bed type), PT/OT evaluation Nutrition Interventions: Offer support with meals,snacks and hydration

## 2018-10-14 NOTE — PROGRESS NOTES
Problem: Falls - Risk of 
Goal: *Absence of Falls Document Ken Chou Fall Risk and appropriate interventions in the flowsheet. Outcome: Progressing Towards Goal 
Fall Risk Interventions: 
Mobility Interventions: Bed/chair exit alarm, OT consult for ADLs, Patient to call before getting OOB, PT Consult for mobility concerns Mentation Interventions: Bed/chair exit alarm, Adequate sleep, hydration, pain control, Door open when patient unattended, More frequent rounding, Reorient patient Medication Interventions: Bed/chair exit alarm, Evaluate medications/consider consulting pharmacy, Patient to call before getting OOB Elimination Interventions: Call light in reach, Bed/chair exit alarm, Patient to call for help with toileting needs History of Falls Interventions: Bed/chair exit alarm, Door open when patient unattended Problem: Pressure Injury - Risk of 
Goal: *Prevention of pressure injury Document Ha Scale and appropriate interventions in the flowsheet. Outcome: Progressing Towards Goal 
Pressure Injury Interventions: 
Sensory Interventions: Assess changes in LOC, Check visual cues for pain, Discuss PT/OT consult with provider, Keep linens dry and wrinkle-free, Pressure redistribution bed/mattress (bed type) Moisture Interventions: Absorbent underpads, Check for incontinence Q2 hours and as needed, Limit adult briefs Activity Interventions: Increase time out of bed, Pressure redistribution bed/mattress(bed type), PT/OT evaluation Mobility Interventions: HOB 30 degrees or less, Pressure redistribution bed/mattress (bed type), PT/OT evaluation Nutrition Interventions: Document food/fluid/supplement intake, Offer support with meals,snacks and hydration

## 2018-10-14 NOTE — PROGRESS NOTES
Brief visit with patient and family Patient was peaceful Thanked them for allowing us to care for them Ezio Walker, staff [address], [id number] [facility] [place]  /   [email or web address]

## 2018-10-14 NOTE — PROGRESS NOTES
Hospitalist Progress Note Subjective:  
Daily Progress Note: 10/14/2018 9:01 AM 
 
Patient presented to ER 10/12 after falling approximately 8 hours prior to presentation, unwitnessed:  Syncope vs mechanical fall. Apparently was on floor for unclear length of time prior to wife discovering him and was lethargic at that time. Incontinent but is unsure of why. CK on admission:  3078. Wife reports left hip and head injury when fell. Baseline also ambulates with walker, able to do same hours after fall. Wife reports frequent falls of late with advancing dementia and declining speech, gait, cognition, and falls. He has recently begun wandering in and outside of the house. Admitted with remote telemetry, unremarkable Head CT, no fracture. Creatinine elevated, SLP, PT, OT, consulted. CM working on [employer] and possible SNF. 
10/13:  SLP in with findings of pocketing solid foods, hoang recommended wait until more alert. Remains drowsy. Changed to inpatient with rhabdomyolysis. CK increasing, fluids increased. PT eval reveals standing for one minute only, then fatigued and has to sit down. Wife informs PT that patient was able to walk around downtown a month ago. 10/14: Old records include diagnosis of vascular dementia as far back as 2014. Note from 9/18 Gerontology indicates confusion, agitation, hallucinations, inability to follow simple directions, falls, behavior changes, falls, urinary incontinence, wandering >1 year, insomnia He sees Dr Rae Anderson, Neurology and Geriatric medicine. Medication review:  Patient on multiple sedatives including:  Depakote, risperdal, remeron, trazodone. Additionally on namenda, aricept. No family in room at present. Patient pleasantly confused, unable to follow commands. Oriented to self only. Feeding self with poor po intake. Patient lives with wife in senior apartment, has caregiver in pm and attends adult  during the day. ADDITIONAL HISTORY:  CAD, Chronic pain, hypertension, stroke with left side residual, chronic back pain, dementia, CKD, lumbar stenosis, BPH, recurrent falls, chronic constipation, JUNAID, oral cavity mass, bipolar disorder Current Facility-Administered Medications Medication Dose Route Frequency  donepezil (ARICEPT) tablet 10 mg  10 mg Oral DAILY  metoprolol tartrate (LOPRESSOR) tablet 25 mg  25 mg Oral DAILY  memantine (NAMENDA) tablet 10 mg  10 mg Oral DAILY  risperiDONE (RisperDAL) tablet 2 mg  2 mg Oral QHS  traZODone (DESYREL) tablet 200 mg  200 mg Oral QHS  sodium chloride (NS) flush 5-10 mL  5-10 mL IntraVENous Q8H  
 sodium chloride (NS) flush 5-10 mL  5-10 mL IntraVENous PRN  
 acetaminophen (TYLENOL) tablet 650 mg  650 mg Oral Q4H PRN  
 ondansetron (ZOFRAN) injection 4 mg  4 mg IntraVENous Q4H PRN  
 heparin (porcine) injection 5,000 Units  5,000 Units SubCUTAneous Q8H  
 0.9% sodium chloride infusion  150 mL/hr IntraVENous CONTINUOUS  
 haloperidol lactate (HALDOL) injection 2 mg  2 mg IntraVENous Q6H PRN  polyethylene glycol (MIRALAX) packet 17 g  17 g Oral DAILY  senna-docusate (PERICOLACE) 8.6-50 mg per tablet 2 Tab  2 Tab Oral QHS  hydrALAZINE (APRESOLINE) 20 mg/mL injection 10 mg  10 mg IntraVENous Q6H PRN  
 amLODIPine (NORVASC) tablet 10 mg  10 mg Oral DAILY  influenza vaccine 2018- (6 mos+)(PF) (FLUARIX QUAD/FLULAVAL QUAD) injection 0.5 mL  0.5 mL IntraMUSCular PRIOR TO DISCHARGE Review of Systems Patient is unable Objective:  
 
Visit Vitals  BP (!) 147/92 (BP 1 Location: Right arm, BP Patient Position: At rest)  Pulse 66  Temp 98 °F (36.7 °C)  Resp 18  Ht 5' 8\" (1.727 m)  Wt 77.1 kg (170 lb)  SpO2 97%  BMI 25.85 kg/m2 O2 Device: Room air Temp (24hrs), Av °F (36.7 °C), Min:97.5 °F (36.4 °C), Max:98.6 °F (37 °C) 
 
10/12 1901 - 10/14 0700 In: 2972 [I.V.:2972] Out: - General appearance: Drowsy, but rouses to name quickly. Confused, oriented to person only. Cooperative but unable to follow commands. Incontinent of urine. Head: Normocephalic, without obvious abnormality, atraumatic Eyes: conjunctivae/corneas clear. PERRL Throat: Lips, mucosa, and tongue normal. Teeth and gums normal 
Neck: supple, symmetrical, trachea midline, no JVD Lungs: clear to auscultation bilaterally Heart: regular rate and rhythm, S1, S2 normal, no murmur, click, rub or gallop Abdomen: soft, non-tender. Bowel sounds normal. No masses,  no organomegaly Extremities: extremities normal, atraumatic, no cyanosis or edema. Muscle atrophy. Skin: Skin color, texture, turgor normal. No rashes or lesions Neurologic: Grossly normal for patient. No unilateral deficit. Additional comments: Notes,orders, test results, vitals reviewed Data Review Recent Results (from the past 24 hour(s)) GLUCOSE, POC Collection Time: 10/13/18 11:07 AM  
Result Value Ref Range Glucose (POC) 108 (H) 65 - 100 mg/dL PLEASE READ & DOCUMENT PPD TEST IN 24 HRS Collection Time: 10/13/18  2:30 PM  
Result Value Ref Range PPD NEG Negative  
 mm Induration 0 mm GLUCOSE, POC Collection Time: 10/13/18  3:46 PM  
Result Value Ref Range Glucose (POC) 112 (H) 65 - 100 mg/dL GLUCOSE, POC Collection Time: 10/13/18 11:04 PM  
Result Value Ref Range Glucose (POC) 83 65 - 100 mg/dL METABOLIC PANEL, BASIC Collection Time: 10/14/18  5:24 AM  
Result Value Ref Range Sodium 144 136 - 145 mmol/L Potassium 3.4 (L) 3.5 - 5.1 mmol/L Chloride 110 (H) 98 - 107 mmol/L  
 CO2 24 21 - 32 mmol/L Anion gap 10 7 - 16 mmol/L Glucose 79 65 - 100 mg/dL BUN 9 8 - 23 MG/DL Creatinine 1.28 0.8 - 1.5 MG/DL  
 GFR est AA >60 >60 ml/min/1.73m2 GFR est non-AA 58 (L) >60 ml/min/1.73m2 Calcium 8.3 8.3 - 10.4 MG/DL  
CK Collection Time: 10/14/18  5:24 AM  
Result Value Ref Range CK 2344 (H) 21 - 215 U/L  
GLUCOSE, POC Collection Time: 10/14/18  8:20 AM  
Result Value Ref Range Glucose (POC) 69 65 - 100 mg/dL LEFT ANKLE: 3 images are presented. Lateral view shows a small possible avulsion off of the dorsal tip of the distal talus. There is soft tissue swelling medially and laterally. Impression: Medial and lateral soft tissue swelling, questionable tiny avulsion off of the dorsal side of the distal talus may just be an accessory ossicle. LEFT HIP:  3 images are presented. No fracture or dislocation is seen. Joint spaces appear well maintained and symmetric. There is been worsening of degenerative changes in the spine. Impression: Worsened degenerative change in the spine, Normal x-ray hips, If avascular necrosis is suspected, MRI would be recommended. CT HEAD:  Atrophic changes are again seen. There are no abnormal intracranial masses, mass effect, nor extra-axial collections seen. There are no abnormal areas of attenuation that would indicate a recent intracranial hemorrhage or infarction. Bone windows show no fractures nor foreign bodies. Impression: Chronic appearing changes. Assessment/Plan:  
Fall without apparent injury Rhabdomyolysis Continue IVF Serial CK Recurrent falls Worsening vascular Dementia Follows with [facility] gerontology and Neurology Hypertension Continue home meds Sleep apnea:  No CPAP 
CAD Unknown type, monitor History of stroke with mild left side residual deficit: at baseline CM working on placement Care Plan discussed with:  Nurse Signed By: Steven Witt NP October 14, 2018

## 2018-10-15 LAB
ANION GAP SERPL CALC-SCNC: 8 MMOL/L (ref 7–16)
ANION GAP SERPL CALC-SCNC: 8 MMOL/L (ref 7–16)
BUN SERPL-MCNC: 7 MG/DL (ref 8–23)
BUN SERPL-MCNC: 8 MG/DL (ref 8–23)
CALCIUM SERPL-MCNC: 8.4 MG/DL (ref 8.3–10.4)
CALCIUM SERPL-MCNC: 8.4 MG/DL (ref 8.3–10.4)
CHLORIDE SERPL-SCNC: 108 MMOL/L (ref 98–107)
CHLORIDE SERPL-SCNC: 109 MMOL/L (ref 98–107)
CK SERPL-CCNC: 1502 U/L (ref 21–215)
CK SERPL-CCNC: 1747 U/L (ref 21–215)
CO2 SERPL-SCNC: 25 MMOL/L (ref 21–32)
CO2 SERPL-SCNC: 26 MMOL/L (ref 21–32)
CREAT SERPL-MCNC: 1.31 MG/DL (ref 0.8–1.5)
CREAT SERPL-MCNC: 1.42 MG/DL (ref 0.8–1.5)
ERYTHROCYTE [DISTWIDTH] IN BLOOD BY AUTOMATED COUNT: 12.4 %
GLUCOSE BLD STRIP.AUTO-MCNC: 106 MG/DL (ref 65–100)
GLUCOSE BLD STRIP.AUTO-MCNC: 85 MG/DL (ref 65–100)
GLUCOSE BLD STRIP.AUTO-MCNC: 92 MG/DL (ref 65–100)
GLUCOSE BLD STRIP.AUTO-MCNC: 99 MG/DL (ref 65–100)
GLUCOSE SERPL-MCNC: 106 MG/DL (ref 65–100)
GLUCOSE SERPL-MCNC: 82 MG/DL (ref 65–100)
HCT VFR BLD AUTO: 37.3 % (ref 41.1–50.3)
HGB BLD-MCNC: 12.6 G/DL (ref 13.6–17.2)
MAGNESIUM SERPL-MCNC: 2 MG/DL (ref 1.8–2.4)
MCH RBC QN AUTO: 30.6 PG (ref 26.1–32.9)
MCHC RBC AUTO-ENTMCNC: 33.8 G/DL (ref 31.4–35)
MCV RBC AUTO: 90.5 FL (ref 79.6–97.8)
NRBC # BLD: 0 K/UL (ref 0–0.2)
PLATELET # BLD AUTO: 179 K/UL (ref 150–450)
PMV BLD AUTO: 11 FL (ref 9.4–12.3)
POTASSIUM SERPL-SCNC: 3.4 MMOL/L (ref 3.5–5.1)
POTASSIUM SERPL-SCNC: 3.6 MMOL/L (ref 3.5–5.1)
RBC # BLD AUTO: 4.12 M/UL (ref 4.23–5.6)
SODIUM SERPL-SCNC: 142 MMOL/L (ref 136–145)
SODIUM SERPL-SCNC: 142 MMOL/L (ref 136–145)
WBC # BLD AUTO: 3.8 K/UL (ref 4.3–11.1)

## 2018-10-15 PROCEDURE — 97530 THERAPEUTIC ACTIVITIES: CPT

## 2018-10-15 PROCEDURE — 82550 ASSAY OF CK (CPK): CPT

## 2018-10-15 PROCEDURE — 65660000000 HC RM CCU STEPDOWN

## 2018-10-15 PROCEDURE — 74011250636 HC RX REV CODE- 250/636: Performed by: INTERNAL MEDICINE

## 2018-10-15 PROCEDURE — 92526 ORAL FUNCTION THERAPY: CPT

## 2018-10-15 PROCEDURE — 74011250637 HC RX REV CODE- 250/637: Performed by: INTERNAL MEDICINE

## 2018-10-15 PROCEDURE — 74011250637 HC RX REV CODE- 250/637: Performed by: NURSE PRACTITIONER

## 2018-10-15 PROCEDURE — 80048 BASIC METABOLIC PNL TOTAL CA: CPT

## 2018-10-15 PROCEDURE — 82962 GLUCOSE BLOOD TEST: CPT

## 2018-10-15 PROCEDURE — 83735 ASSAY OF MAGNESIUM: CPT

## 2018-10-15 PROCEDURE — 36415 COLL VENOUS BLD VENIPUNCTURE: CPT

## 2018-10-15 PROCEDURE — 77030020263 HC SOL INJ SOD CL0.9% LFCR 1000ML

## 2018-10-15 PROCEDURE — 85027 COMPLETE CBC AUTOMATED: CPT

## 2018-10-15 PROCEDURE — 74011000250 HC RX REV CODE- 250: Performed by: INTERNAL MEDICINE

## 2018-10-15 RX ADMIN — Medication 10 ML: at 22:31

## 2018-10-15 RX ADMIN — HALOPERIDOL LACTATE 2 MG: 5 INJECTION, SOLUTION INTRAMUSCULAR at 05:37

## 2018-10-15 RX ADMIN — Medication 5 ML: at 13:42

## 2018-10-15 RX ADMIN — POLYETHYLENE GLYCOL 3350 17 G: 17 POWDER, FOR SOLUTION ORAL at 08:11

## 2018-10-15 RX ADMIN — SODIUM CHLORIDE 150 ML/HR: 900 INJECTION, SOLUTION INTRAVENOUS at 14:18

## 2018-10-15 RX ADMIN — HEPARIN SODIUM 5000 UNITS: 5000 INJECTION INTRAVENOUS; SUBCUTANEOUS at 13:39

## 2018-10-15 RX ADMIN — AMLODIPINE BESYLATE 10 MG: 10 TABLET ORAL at 08:10

## 2018-10-15 RX ADMIN — TRAZODONE HYDROCHLORIDE 200 MG: 50 TABLET ORAL at 22:31

## 2018-10-15 RX ADMIN — HEPARIN SODIUM 5000 UNITS: 5000 INJECTION INTRAVENOUS; SUBCUTANEOUS at 05:05

## 2018-10-15 RX ADMIN — METOPROLOL TARTRATE 25 MG: 25 TABLET ORAL at 08:10

## 2018-10-15 RX ADMIN — SENNOSIDES AND DOCUSATE SODIUM 2 TABLET: 8.6; 5 TABLET ORAL at 22:31

## 2018-10-15 RX ADMIN — HALOPERIDOL LACTATE 2 MG: 5 INJECTION, SOLUTION INTRAMUSCULAR at 22:58

## 2018-10-15 RX ADMIN — RISPERIDONE 2 MG: 1 TABLET ORAL at 22:31

## 2018-10-15 RX ADMIN — ACETAMINOPHEN 650 MG: 325 TABLET, FILM COATED ORAL at 14:13

## 2018-10-15 RX ADMIN — SODIUM CHLORIDE 150 ML/HR: 900 INJECTION, SOLUTION INTRAVENOUS at 05:05

## 2018-10-15 RX ADMIN — MEMANTINE HYDROCHLORIDE 10 MG: 5 TABLET ORAL at 08:10

## 2018-10-15 RX ADMIN — HEPARIN SODIUM 5000 UNITS: 5000 INJECTION INTRAVENOUS; SUBCUTANEOUS at 22:31

## 2018-10-15 RX ADMIN — Medication 5 ML: at 05:05

## 2018-10-15 RX ADMIN — DONEPEZIL HYDROCHLORIDE 10 MG: 5 TABLET, FILM COATED ORAL at 08:09

## 2018-10-15 NOTE — PROGRESS NOTES
Problem: Falls - Risk of 
Goal: *Absence of Falls Document Yulietalondra Chou Fall Risk and appropriate interventions in the flowsheet. Outcome: Progressing Towards Goal 
Fall Risk Interventions: 
Mobility Interventions: Bed/chair exit alarm, OT consult for ADLs, Patient to call before getting OOB, PT Consult for mobility concerns Mentation Interventions: Bed/chair exit alarm, Door open when patient unattended, Adequate sleep, hydration, pain control, More frequent rounding, Reorient patient Medication Interventions: Bed/chair exit alarm, Evaluate medications/consider consulting pharmacy, Patient to call before getting OOB Elimination Interventions: Call light in reach, Bed/chair exit alarm, Toileting schedule/hourly rounds History of Falls Interventions: Bed/chair exit alarm, Door open when patient unattended, Investigate reason for fall Problem: Pressure Injury - Risk of 
Goal: *Prevention of pressure injury Document Ha Scale and appropriate interventions in the flowsheet. Outcome: Progressing Towards Goal 
Pressure Injury Interventions: 
Sensory Interventions: Assess changes in LOC, Discuss PT/OT consult with provider, Check visual cues for pain, Keep linens dry and wrinkle-free, Pressure redistribution bed/mattress (bed type) Moisture Interventions: Absorbent underpads, Apply protective barrier, creams and emollients, Check for incontinence Q2 hours and as needed, Limit adult briefs Activity Interventions: Increase time out of bed, Pressure redistribution bed/mattress(bed type), PT/OT evaluation Mobility Interventions: HOB 30 degrees or less, PT/OT evaluation, Pressure redistribution bed/mattress (bed type) Nutrition Interventions: Document food/fluid/supplement intake, Offer support with meals,snacks and hydration

## 2018-10-15 NOTE — PROGRESS NOTES
Problem: Interdisciplinary Rounds Goal: Interdisciplinary Rounds Outcome: Progressing Towards Goal 
Interdisciplinary team rounds were held 10/15/2018 with the following team members:Care Management, Nurse Practitioner, Physical Therapy and  and the patient. [facility] to evaluate patient. Plan of care discussed. See clinical pathway and/or care plan for interventions and desired outcomes.

## 2018-10-15 NOTE — PROGRESS NOTES
Hospitalist Progress Note Subjective:  
Daily Progress Note: 10/15/2018 12:00 PM 
 
Patient presented to ER 10/12 after falling approximately 8 hours prior to presentation, unwitnessed:  Syncope vs mechanical fall. Apparently was on floor for unclear length of time prior to wife discovering him and was lethargic at that time. Incontinent but is unsure of why. CK on admission:  3078. Wife reports left hip and head injury when fell. Baseline also ambulates with walker, able to do same hours after fall. Wife reports frequent falls of late with advancing dementia and declining speech, gait, cognition, and falls. He has recently begun wandering in and outside of the house. Admitted with remote telemetry, unremarkable Head CT, no fracture. Creatinine elevated, SLP, PT, OT, consulted. CM working on [address] [place] and possible SNF. 
10/13:  SLP in with findings of pocketing solid foods, hoang recommended wait until more alert. Remains drowsy. Changed to inpatient with rhabdomyolysis. CK increasing, fluids increased. PT eval reveals standing for one minute only, then fatigued and has to sit down. Wife informs PT that patient was able to walk around downtown a month ago. 10/14: Old records include diagnosis of vascular dementia as far back as 2014. Note from 9/18 Gerontology indicates confusion, agitation, hallucinations, inability to follow simple directions, falls, behavior changes, falls, urinary incontinence, wandering >1 year, insomnia He sees Dr Bina Bo, Neurology and Geriatric medicine. Medication review:  Patient on multiple sedatives including:  Depakote, risperdal, remeron, trazodone. Additionally on namenda, aricept. No family in room at present. Patient pleasantly confused, unable to follow commands. Oriented to self only. Feeding self with poor po intake. PPD with 17 mm induration, CXR below. CK down to 2344 today. 10/15:  No family present at the time of rounds.   Mentation remains impaired. Pleasant and cooperative. Nicolasa Piedra: 7884. Up with PT, unsteady on feet. CM attempting to place patient, Critical access hospital to assess today. SLP in with recommendations for mechanical soft with thin liquids. Pocketing food. Patient lives with wife in senior apartment, has caregiver in pm and attends adult  during the day. ADDITIONAL HISTORY:  CAD, Chronic pain, hypertension, stroke with left side residual, chronic back pain, dementia, CKD, lumbar stenosis, BPH, recurrent falls, chronic constipation, JUNAID, oral cavity mass, bipolar disorder 
  
Current Facility-Administered Medications Medication Dose Route Frequency  donepezil (ARICEPT) tablet 10 mg  10 mg Oral DAILY  metoprolol tartrate (LOPRESSOR) tablet 25 mg  25 mg Oral DAILY  memantine (NAMENDA) tablet 10 mg  10 mg Oral DAILY  risperiDONE (RisperDAL) tablet 2 mg  2 mg Oral QHS  traZODone (DESYREL) tablet 200 mg  200 mg Oral QHS  sodium chloride (NS) flush 5-10 mL  5-10 mL IntraVENous Q8H  
 sodium chloride (NS) flush 5-10 mL  5-10 mL IntraVENous PRN  
 acetaminophen (TYLENOL) tablet 650 mg  650 mg Oral Q4H PRN  
 ondansetron (ZOFRAN) injection 4 mg  4 mg IntraVENous Q4H PRN  
 heparin (porcine) injection 5,000 Units  5,000 Units SubCUTAneous Q8H  
 0.9% sodium chloride infusion  150 mL/hr IntraVENous CONTINUOUS  
 haloperidol lactate (HALDOL) injection 2 mg  2 mg IntraVENous Q6H PRN  polyethylene glycol (MIRALAX) packet 17 g  17 g Oral DAILY  senna-docusate (PERICOLACE) 8.6-50 mg per tablet 2 Tab  2 Tab Oral QHS  hydrALAZINE (APRESOLINE) 20 mg/mL injection 10 mg  10 mg IntraVENous Q6H PRN  
 amLODIPine (NORVASC) tablet 10 mg  10 mg Oral DAILY  influenza vaccine 2018-19 (6 mos+)(PF) (FLUARIX QUAD/FLULAVAL QUAD) injection 0.5 mL  0.5 mL IntraMUSCular PRIOR TO DISCHARGE Review of Systems Patient is unable. Objective:  
 
Visit Vitals  BP (!) 150/95 (BP 1 Location: Right arm, BP Patient Position: At rest)  Pulse 66  Temp 98.4 °F (36.9 °C)  Resp 18  Ht 5' 8\" (1.727 m)  Wt 77.1 kg (170 lb)  SpO2 95%  BMI 25.85 kg/m2 O2 Device: Room air Temp (24hrs), Av.4 °F (36.9 °C), Min:98.1 °F (36.7 °C), Max:98.6 °F (37 °C) 
 
10/13 1901 - 10/15 0700 In: 2972 [I.V.:2972] Out: - General appearance: Drowsy, but rouses to name quickly. Confused, oriented to person only. Cooperative but unable to follow commands. Incontinent of urine. Head: Normocephalic, without obvious abnormality, atraumatic Eyes: conjunctivae/corneas clear. PERRL Throat: Lips, mucosa, and tongue normal. Teeth and gums normal 
Neck: supple, symmetrical, trachea midline, no JVD Lungs: clear to auscultation bilaterally Heart: regular rate and rhythm, S1, S2 normal, no murmur, click, rub or gallop Abdomen: soft, non-tender. Bowel sounds normal. No masses,  no organomegaly Extremities: extremities normal, atraumatic, no cyanosis or edema. Muscle atrophy. Skin: Skin color, texture, turgor normal. No rashes or lesions Neurologic: Grossly normal for patient. No unilateral deficit.  
  
Additional comments: Notes,orders, test results, vitals reviewed Data Review Recent Results (from the past 24 hour(s)) GLUCOSE, POC Collection Time: 10/14/18  4:47 PM  
Result Value Ref Range Glucose (POC) 93 65 - 100 mg/dL PLEASE READ & DOCUMENT PPD TEST IN 48 HRS Collection Time: 10/14/18  6:10 PM  
Result Value Ref Range PPD  Negative  
 mm Induration 17 mm mm GLUCOSE, POC Collection Time: 10/14/18  9:20 PM  
Result Value Ref Range Glucose (POC) 78 65 - 100 mg/dL METABOLIC PANEL, BASIC Collection Time: 10/15/18  5:18 AM  
Result Value Ref Range Sodium 142 136 - 145 mmol/L Potassium 3.4 (L) 3.5 - 5.1 mmol/L Chloride 108 (H) 98 - 107 mmol/L  
 CO2 26 21 - 32 mmol/L Anion gap 8 7 - 16 mmol/L Glucose 82 65 - 100 mg/dL BUN 7 (L) 8 - 23 MG/DL Creatinine 1.42 0.8 - 1.5 MG/DL  
 GFR est AA >60 >60 ml/min/1.73m2 GFR est non-AA 52 (L) >60 ml/min/1.73m2 Calcium 8.4 8.3 - 10.4 MG/DL  
CK Collection Time: 10/15/18  5:18 AM  
Result Value Ref Range CK 1747 (H) 21 - 215 U/L  
GLUCOSE, POC Collection Time: 10/15/18  8:04 AM  
Result Value Ref Range Glucose (POC) 92 65 - 100 mg/dL LEFT ANKLE: 3 images are presented. Lateral view shows a small possible avulsion off of the dorsal tip of the distal talus. There is soft tissue swelling medially and laterally. Impression: Medial and lateral soft tissue swelling, questionable tiny avulsion off of the dorsal side of the distal talus may just be an accessory ossicle. 
  
LEFT HIP:  3 images are presented. No fracture or dislocation is seen. Joint spaces appear well maintained and symmetric. There is been worsening of degenerative changes in the spine. Impression: Worsened degenerative change in the spine, Normal x-ray hips, If avascular necrosis is suspected, MRI would be recommended.  
  
CT HEAD:  Atrophic changes are again seen. There are no abnormal intracranial masses, mass effect, nor extra-axial collections seen.  There are no abnormal areas of attenuation that would indicate a recent intracranial hemorrhage or infarction.  Bone windows show no fractures nor foreign bodies. Impression: Chronic appearing changes. 
   
 10/14:  CXR: Upright AP portable chest at 7:04 PM. Lungs are mildly underventilated. No new airspace consolidation nor edema. No overt effusions. The cardiac silhouette is stable. No specific signs to suggest active tuberculosis. IMPRESSION:  No acute abnormality. Assessment/Plan:  
Fall without apparent injury, unknown time down Rhabdomyolysis Continue IVF Serial CK Recurrent falls Worsening vascular Dementia with wandering Follows with [facility] gerontology and Neurology Hypertension Continue home meds Sleep apnea:  No CPAP 
CAD Unknown type, monitor History of stroke with mild left side residual deficit: at baseline 
  
CM working on placement at [facility] Discharge to SNF when bed available Care Plan discussed with:  Nurse, Dr Edwin hSi, care team, [facility] Signed By: Antionette Scherer NP October 15, 2018

## 2018-10-15 NOTE — PROGRESS NOTES
Problem: Dysphagia (Adult) Goal: *Acute Goals and Plan of Care (Insert Text) ST. Pt. Will tolerate mech soft/thin liquids with no overt s/sx of aspiration/penetration. Goal revised. 2. Patient will tolerate trial upgrades with SLP with adequate oral clearance with 80% accuracy. LTG: Pt. Will tolerate least restrictive diet without respiratory decline. Speech language pathology: observation bedside swallow note: Daily Note: 1 NAME/AGE/GENDER: Charito Bain is a 75 y.o. male DATE: 10/15/2018 PRIMARY DIAGNOSIS: Recurrent falls Rhabdomyolysis ICD-10: Treatment Diagnosis: R13.12 Dysphagia, Oropharyngeal Phase INTERDISCIPLINARY COLLABORATION: Registered Nurse PRECAUTIONS/ALLERGIES: Pcn [penicillins] and Pcn [penicillins] ASSESSMENT:Pt seen for diet tolerance and possible upgrade to mech soft. Pt's wife at bedside and reported pt doesn't like the pureed foods very much. She reported he is eating them however. She reported he is not able to self feed. Pt drowsy but verbal.  Pt given trials thin liquids, pureed and mixed consistency. Pt able to feed self all trials this date. No signs/sx aspiration observed. Increased mastication time observed with mixed which pt's spouse reported is baseline. Mild lingual residue x1 with mixed which pt was able to clear with a cued liquid wash. No pocketing this date. Recommend upgrading to mech soft with chopped meats. Will follow x1 for diet tolerance. Patient will benefit from skilled intervention to address the below impairments. ?????? ? ? This section established at most recent assessment?????????? 
PROBLEM LIST (Impairments causing functional limitations): 1. Dysphagia/pocketing REHABILITATION POTENTIAL FOR STATED GOALS: Fair PLAN OF CARE:  
Patient will benefit from skilled intervention to address the following impairments.  
RECOMMENDATIONS AND PLANNED INTERVENTIONS (Benefits and precautions of therapy have been discussed with the patient.): 
 · PO:  Mechanical soft with chopped meat and vegetables · Liquids:  regular thin MEDICATIONS: 
· Crushed in puree · With liquid · as tolerated COMPENSATORY STRATEGIES/MODIFICATIONS INCLUDING: 
· Alternate liquids/solids · Small sips and bites OTHER RECOMMENDATIONS (including follow up treatment recommendations):  
· Patient education RECOMMENDED DIET MODIFICATIONS DISCUSSED WITH: 
· Nursing · Patient FREQUENCY/DURATION: Continue to follow patient 3 times a week for duration of hospital stay to address above goals. RECOMMENDED REHABILITATION/EQUIPMENT: (at time of discharge pending progress): Due to the probability of continued deficits (see above) this patient will not likely need continued skilled speech therapy after discharge. SUBJECTIVE:  
Pt drowsy but cooperative. Spouse present. History of Present Injury/Illness: Mr. Domitila Mireles  has a past medical history of CAD (coronary artery disease); Chronic back pain; Dementia (10/12/2018); Hypertension; and Stroke [facility]). He also has no past medical history of Arthritis; Asthma; Autoimmune disease ([address] Cancer ([address] Chronic kidney disease; COPD; DEMENTIA; Diabetes ([address] Endocrine disease; Gastrointestinal disorder; Heart failure ([address] Hypertension; Infectious disease; Liver disease; Other ill-defined conditions(799.89); Psychiatric disorder; PUD (peptic ulcer disease); Seizures ([address] Sleep disorder; or Thromboembolus ([address] .  He also  has a past surgical history that includes hx heent. Present Symptoms:   
Pain Intensity 1: 6 Pain Location 1: Back Current Medications: No current facility-administered medications on file prior to encounter. Current Outpatient Prescriptions on File Prior to Encounter Medication Sig Dispense Refill  omeprazole (PRILOSEC OTC) 20 mg tablet Take 20 mg by mouth daily. 31 Tab 2  
 memantine (NAMENDA) 10 mg tablet Take 10 mg by mouth daily.  amLODIPine (NORVASC) 10 mg tablet Take 5 mg by mouth daily. Current Dietary Status:   
 Regular/thin History of reflux:  YES  
? Reflux medication:prilosec Social History/Home Situation:   
Home Environment: Private residence # Steps to Enter: 0 Wheelchair Ramp: No 
One/Two Story Residence: One story Living Alone: No 
Support Systems: Spouse/Significant Other/Partner Patient Expects to be Discharged to[de-identified] Rehabilitation facility Current DME Used/Available at Home: [place], straight, [place], rollator Tub or Shower Type: Shower OBJECTIVE:  
Respiratory Status: CXR Results:n/a 
CT Results:Impression: Chronic appearing changes. Oral Motor Structure/Speech:  Oral-Motor Structure/Motor Speech Labial: Decreased rate, Decreased seal 
Dentition: Limited Oral Hygiene:  (food residue from AM meal) Lingual: Decreased rate, Decreased strength Cognitive and Communication Status: 
Neurologic State: Drowsy BEDSIDE SWALLOW EVALUATIONOral Assessment: P.O. Trials: 
Patient Position: upright in bed The patient was given bite/sip amounts of the following:  
Consistency Presented: Mixed consistency;Puree; Thin liquid How Presented: Self-fed/presented;Cup/sip;Spoon;Straw;Successive swallows ORAL PHASE: 
Bolus Acceptance: No impairment Bolus Formation/Control: Impaired Propulsion: No impairment Type of Impairment: Delayed;Mastication Oral Residue: 10-50% of bolus; Lingual 
 
PHARYNGEAL PHASE: 
Initiation of Swallow: No impairment Laryngeal Elevation: Functional 
Aspiration Signs/Symptoms: None Vocal Quality: No impairment OTHER OBSERVATIONS: 
Rate/bite size: Impaired Endurance:  Impaired and Questionable Comments: 1:1 assist/supervision Tool Used: Dysphagia Outcome and Severity Scale (SHABANA) Score Comments Normal Diet  [] 7 With no strategies or extra time needed Functional Swallow  [] 6 May have mild oral or pharyngeal delay Mild Dysphagia 
  [] 5 Which may require one diet consistency restricted (those who demonstrate penetration which is entirely cleared on MBS would be included) Mild-Moderate Dysphagia  [] 4 With 1-2 diet consistencies restricted Moderate Dysphagia  [x] 3 With 2 or more diet consistencies restricted Moderately Severe Dysphagia  [] 2 With partial PO strategies (trials with ST only) Severe Dysphagia  [] 1 With inability to tolerate any PO safely Score:  Initial: 3 Most Recent: X (Date: -- ) Interpretation of Tool: The Dysphagia Outcome and Severity Scale (SHABANA) is a simple, easy-to-use, 7-point scale developed to systematically rate the functional severity of dysphagia based on objective assessment and make recommendations for diet level, independence level, and type of nutrition. Score 7 6 5 4 3 2 1 Modifier CH CI CJ CK CL CM CN ? Swallowing:  
  - CURRENT STATUS: CL - 60%-79% impaired, limited or restricted  - GOAL STATUS:  CJ - 20%-39% impaired, limited or restricted  - D/C STATUS:  ---------------To be determined--------------- Payor: [facility] [place] MED[facility]ARE / Plan[place] [employer] [place]O / Product Type: Managed Care Medicare /  
 
TREATMENT:  
 (In addition to Assessment/Re-Assessment sessions the following treatments were rendered) Dysphagia Activities: Activities/Procedures listed utilized to improve progress in diet tolerance. Required minimal cueing to improve swallow safety and work toward diet advancement. MODALITIES:  
 
 
__________________________________________________________________________________________________ Safety: After treatment position/precautions: 
· Call light within reach · RN notified · Upright in Bed Treatment Assessment:  Patient required mod cues to participate in evaluation. Progression/Medical Necessity:  
· Skilled intervention continues to be required due to patient still consuming a modified diet. Compliance with Program/Exercises: Will assess as treatment progresses. Reason for Continuation of Services/Other Comments: 
· Patient continues to require skilled intervention due to dysphagia. Recommendations/Intent for next treatment session: \"Treatment next visit will focus on po trials\". Total Treatment Duration: 
Time In: 9053 Time Out: 1434 [address], [facility] REGAN SYED, CCC-SLP

## 2018-10-15 NOTE — PROGRESS NOTES
Problem: Mobility Impaired (Adult and Pediatric) Goal: *Acute Goals and Plan of Care (Insert Text) LTG: 
(1.)Mr. Roxana Eid will move from supine to sit and sit to supine , scoot up and down and roll side to side in bed with MINIMAL ASSIST within 7 treatment day(s). (2.)Mr. Roxana Eid will transfer from bed to chair and chair to bed with MODERATE ASSIST using the least restrictive device within 7 treatment day(s). (3.)Mr. Roxana Eid will ambulate with MODERATE ASSIST for >or=10 feet with the least restrictive device, fair dynamic standing balance within 7 treatment day(s). (4.)Mr. Roxana Eid will tolerate sitting upright in bedside chair x 2-4 hours to increase tolerance for physical activity within 7 treatment days. (5.)Mr. Roxana Eid will perform B LE therapeutic exercises x 20 reps with MINIMAL ASSIST within 7 days to increase strength for improved safety and independence in transfers and gait. 
________________________________________________________________________________________________ PHYSICAL THERAPY: Daily Note, Treatment Day: 1st, AM 10/15/2018INPATIENT: Hospital Day: 4 Payor: [facility] [place] [facility] / Plan: Avis Meyer [place] / Product Type: Managed Care Medicare /  
  
NAME/AGE/GENDER: Yael Longo is a 75 y.o. male PRIMARY DIAGNOSIS: Recurrent falls Rhabdomyolysis Non-traumatic rhabdomyolysis Non-traumatic rhabdomyolysis ICD-10: Treatment Diagnosis:  
 · Generalized Muscle Weakness (M62.81) · Other lack of cordination (R27.8) · Difficulty in walking, Not elsewhere classified (R26.2) · History of falling (Z91.81) Precaution/Allergies: 
Pcn [penicillins] and Pcn [penicillins] ASSESSMENT:  
Mr. Roxana Eid was supine upon contact; initially lethargic and drowsy but perked up as treatment progress. Patient agreeable to PT treatment.  Patient presents confused but pleasant with delayed/decreased command following requiring additional time and multiple cues to perform desired tasks. Patient performs supine to sit with max assist x 2 and max verbal, tactile, and manual cues for proper technique. Patient initially demonstrates poor sitting balance on EOB but able to improve to fair static sitting balance with cues. Patient participates in transfer training x 3 reps requiring min-mod assist x 2, RW, and cues for improved/proper technique. Once standing patient demonstrates mostly poor standing balance but has periods of fair balance with UE support on rolling walker. Patient unable to tolerate static standing for more than 1 minute before needing to sit down. Patient returns to supine with mod assist and cues for technique. Overall slow progress towards physical therapy goals. No goals have been met thus far. Will continue efforts. This section established at most recent assessment PROBLEM LIST (Impairments causing functional limitations): 1. Decreased Strength 2. Decreased ADL/Functional Activities 3. Decreased Transfer Abilities 4. Decreased Ambulation Ability/Technique 5. Decreased Balance 6. Decreased Activity Tolerance 7. Decreased Flexibility/Joint Mobility 8. Decreased [place] with Home Exercise Program 
9. Decreased Cognition INTERVENTIONS PLANNED: (Benefits and precautions of physical therapy have been discussed with the patient.) 1. Balance Exercise 2. Bed Mobility 3. Family Education 4. Gait Training 5. Home Exercise Program (HEP) 6. Therapeutic Activites 7. Therapeutic Exercise/Strengthening 8. Transfer Training 9. Group Therapy TREATMENT PLAN: Frequency/Duration: 3 times a week for duration of hospital stayRehabilitation Potential For Stated Goals: Good RECOMMENDED REHABILITATION/EQUIPMENT: (at time of discharge pending progress): Due to the probability of continued deficits (see above) this patient will likely need continued skilled physical therapy after discharge. Equipment:  
? None at this time HISTORY:  
 History of Present Injury/Illness (Reason for Referral): 
Per chart: Pt is a 74 yo male with pmh dementia with behavioral disturbances, HTN, CVA, CAD who has had multiple ER visits this year. He presented to ER today via EMS due to his wife unable to pick him up off floor after fall at home. Pt provides no history of event due to dementia. Wife reports that pt fell around 0230 and she wasn't able to get him up until a family member came to visit hours later, when EMS was eventually called. Pt drowsy on arrival.  Lab work overall okay except CK elevated. Vitals stable. CT head without acute findings. He denies pain at this time. Family is hopeful for d/c to STR due to pt's weakness and recent falls.  
  
Past Medical History/Comorbidities:  
Mr. Star Ferguson  has a past medical history of CAD (coronary artery disease); Chronic back pain; Dementia (10/12/2018); Hypertension; and Stroke [facility]). He also has no past medical history of Arthritis; Asthma; Autoimmune disease ([address] Cancer ([address] Chronic kidney disease; COPD; DEMENTIA; Diabetes ([address] Endocrine disease; Gastrointestinal disorder; Heart failure ([address] Hypertension; Infectious disease; Liver disease; Other ill-defined conditions(799.89); Psychiatric disorder; PUD (peptic ulcer disease); Seizures ([address] Sleep disorder; or Thromboembolus ([address] Mr. Star Ferguson  has a past surgical history that includes hx heent. Social History/Living Environment:  
Home Environment: Private residence # Steps to Enter: 0 Wheelchair Ramp: No 
One/Two Story Residence: One story Living Alone: No 
Support Systems: Spouse/Significant Other/Partner Patient Expects to be Discharged to[de-identified] Rehabilitation facility Current DME Used/Available at Home: matt [place], Aashish Hernandez, rollator Tub or Shower Type: Shower Prior Level of Function/Work/Activity: 
Pt required constant supervision but per wife, pt was able to ambulate with rollator in apartment and in community with only SBA. Pt states there have been no other falls. Pt requires assistance with ADLs. Personal Factors:   
      Sex:  male Age:  75 y.o. Number of Personal Factors/Comorbidities that affect the Plan of Care: 1-2: MODERATE COMPLEXITY EXAMINATION:  
Most Recent Physical Functioning:  
Gross Assessment: 
  
         
  
Posture: 
  
Balance: 
Sitting: Impaired Sitting - Static: Fair (occasional) Sitting - Dynamic: Poor (constant support) Standing: Impaired Standing - Static: Poor (occasionally demonstrates fair static standing balance) Standing - Dynamic : Poor Bed Mobility: 
Supine to Sit: Maximum assistance;Assist x2 Sit to Supine: Moderate assistance Wheelchair Mobility: 
  
Transfers: 
Sit to Stand: Minimum assistance; Moderate assistance;Assist x2 Stand to Sit: Minimum assistance; Moderate assistance;Assist x2 Gait: 
  
   
  
Body Structures Involved: 1. Bones 2. Joints 3. Muscles Body Functions Affected: 1. Neuromusculoskeletal 
2. Movement Related Activities and Participation Affected: 1. General Tasks and Demands 2. Mobility 3. Self Care Number of elements that affect the Plan of Care: 4+: HIGH COMPLEXITY CLINICAL PRESENTATION:  
Presentation: Stable and uncomplicated: LOW COMPLEXITY CLINICAL DECISION MAKING:  
[facility] [place] [facility] -[facility] 6 Clicks Basic Mobility Inpatient Short Form How much difficulty does the patient currently have. .. Unable A Lot A Little None 1. Turning over in bed (including adjusting bedclothes, sheets and blankets)? [] 1   [x] 2   [] 3   [] 4  
2. Sitting down on and standing up from a chair with arms ( e.g., wheelchair, bedside commode, etc.)   [] 1   [x] 2   [] 3   [] 4  
3. Moving from lying on back to sitting on the side of the bed? [] 1   [x] 2   [] 3   [] 4 How much help from another person does the patient currently need. .. Total A Lot A Little None 4. Moving to and from a bed to a chair (including a wheelchair)? [] 1   [x] 2   [] 3   [] 4  
5. Need to walk in hospital room? [x] 1   [] 2   [] 3   [] 4  
6. Climbing 3-5 steps with a railing? [x] 1   [] 2   [] 3   [] 4  
© 2007, Trustees of [address] [place] [address] Box [facility], under license to [employer]. All rights reserved Score:  Initial: 10 Most Recent: X (Date: -- ) Interpretation of Tool:  Represents activities that are increasingly more difficult (i.e. Bed mobility, Transfers, Gait). Score 24 23 22-20 19-15 14-10 9-7 6 Modifier CH CI CJ CK CL CM CN   
 
? Mobility - Walking and Moving Around:  
  - CURRENT STATUS: CL - 60%-79% impaired, limited or restricted  - GOAL STATUS: CL - 60%-79% impaired, limited or restricted  - D/C STATUS:  ---------------To be determined--------------- Payor: [facility] [place] / [place] / Product Type: Managed Care Medicare /   
 
Medical Necessity:    
· Patient demonstrates fair rehab potential due to higher previous functional level. Reason for Services/Other Comments: 
· Patient continues to require present interventions due to patient's inability to function at baseline. Use of outcome tool(s) and clinical judgement create a POC that gives a: Questionable prediction of patient's progress: MODERATE COMPLEXITY  
  
 
 
 
TREATMENT:  
(In addition to Assessment/Re-Assessment sessions the following treatments were rendered) Pre-treatment Symptoms/Complaints:  Confused, delayed command following Pain: Initial:  
Pain Intensity 1: 0  Post Session:  0/10 Therapeutic Activity: (    30 minutes):   Therapeutic activities including bed mobility training, static/dynamic sitting/standing balance training, scooting, posture training, transfer training, and patient education  to improve mobility, balance, coordination and tolerance for physical activity. Required moderate verbal, tactile, and manual cues cues   to promote static and dynamic balance in standing, promote coordination of bilateral, lower extremity(s) and promote functional independence. Braces/Orthotics/Lines/Etc:  
· IV 
· O2 Device: Room air Treatment/Session Assessment:   
· Response to Treatment:  See above · Interdisciplinary Collaboration:  
o Physical Therapy Assistant 
o Registered Nurse 
o Rehabilitation Attendant 
o SPTA · After treatment position/precautions:  
o Supine in bed 
o Bed alarm/tab alert on 
o Bed/Chair-wheels locked 
o Bed in low position 
o Call light within reach 
o RN notified · Compliance with Program/Exercises: Will assess as treatment progresses. · Recommendations/Intent for next treatment session: \"Next visit will focus on advancements to more challenging activities and reduction in assistance provided\". Total Treatment Duration: PT Patient Time In/Time Out Time In: 1 Time Out: [address], [facility]

## 2018-10-16 LAB
ATRIAL RATE: 70 BPM
CALCULATED P AXIS, ECG09: 62 DEGREES
CALCULATED R AXIS, ECG10: 27 DEGREES
CALCULATED T AXIS, ECG11: 47 DEGREES
CK SERPL-CCNC: 933 U/L (ref 21–215)
DIAGNOSIS, 93000: NORMAL
GLUCOSE BLD STRIP.AUTO-MCNC: 104 MG/DL (ref 65–100)
GLUCOSE BLD STRIP.AUTO-MCNC: 109 MG/DL (ref 65–100)
GLUCOSE BLD STRIP.AUTO-MCNC: 81 MG/DL (ref 65–100)
GLUCOSE BLD STRIP.AUTO-MCNC: 88 MG/DL (ref 65–100)
P-R INTERVAL, ECG05: 152 MS
Q-T INTERVAL, ECG07: 414 MS
QRS DURATION, ECG06: 74 MS
QTC CALCULATION (BEZET), ECG08: 447 MS
TROPONIN I SERPL-MCNC: <0.02 NG/ML (ref 0.02–0.05)
VENTRICULAR RATE, ECG03: 70 BPM

## 2018-10-16 PROCEDURE — 92526 ORAL FUNCTION THERAPY: CPT

## 2018-10-16 PROCEDURE — 77030020263 HC SOL INJ SOD CL0.9% LFCR 1000ML

## 2018-10-16 PROCEDURE — 97110 THERAPEUTIC EXERCISES: CPT

## 2018-10-16 PROCEDURE — 97530 THERAPEUTIC ACTIVITIES: CPT

## 2018-10-16 PROCEDURE — 74011250637 HC RX REV CODE- 250/637: Performed by: INTERNAL MEDICINE

## 2018-10-16 PROCEDURE — 36415 COLL VENOUS BLD VENIPUNCTURE: CPT

## 2018-10-16 PROCEDURE — 65660000000 HC RM CCU STEPDOWN

## 2018-10-16 PROCEDURE — 82962 GLUCOSE BLOOD TEST: CPT

## 2018-10-16 PROCEDURE — 74011250637 HC RX REV CODE- 250/637: Performed by: NURSE PRACTITIONER

## 2018-10-16 PROCEDURE — 93005 ELECTROCARDIOGRAM TRACING: CPT | Performed by: NURSE PRACTITIONER

## 2018-10-16 PROCEDURE — 74011250636 HC RX REV CODE- 250/636: Performed by: INTERNAL MEDICINE

## 2018-10-16 PROCEDURE — 82550 ASSAY OF CK (CPK): CPT

## 2018-10-16 PROCEDURE — 84484 ASSAY OF TROPONIN QUANT: CPT

## 2018-10-16 PROCEDURE — 74011000250 HC RX REV CODE- 250: Performed by: INTERNAL MEDICINE

## 2018-10-16 RX ADMIN — METOPROLOL TARTRATE 25 MG: 25 TABLET ORAL at 08:50

## 2018-10-16 RX ADMIN — POLYETHYLENE GLYCOL 3350 17 G: 17 POWDER, FOR SOLUTION ORAL at 08:50

## 2018-10-16 RX ADMIN — SODIUM CHLORIDE 150 ML/HR: 900 INJECTION, SOLUTION INTRAVENOUS at 08:50

## 2018-10-16 RX ADMIN — Medication 10 ML: at 22:24

## 2018-10-16 RX ADMIN — MEMANTINE HYDROCHLORIDE 10 MG: 5 TABLET ORAL at 08:50

## 2018-10-16 RX ADMIN — SENNOSIDES AND DOCUSATE SODIUM 2 TABLET: 8.6; 5 TABLET ORAL at 22:23

## 2018-10-16 RX ADMIN — DONEPEZIL HYDROCHLORIDE 10 MG: 5 TABLET, FILM COATED ORAL at 08:50

## 2018-10-16 RX ADMIN — HEPARIN SODIUM 5000 UNITS: 5000 INJECTION INTRAVENOUS; SUBCUTANEOUS at 05:58

## 2018-10-16 RX ADMIN — AMLODIPINE BESYLATE 10 MG: 10 TABLET ORAL at 08:50

## 2018-10-16 RX ADMIN — TRAZODONE HYDROCHLORIDE 200 MG: 50 TABLET ORAL at 22:23

## 2018-10-16 RX ADMIN — SODIUM CHLORIDE 150 ML/HR: 900 INJECTION, SOLUTION INTRAVENOUS at 22:28

## 2018-10-16 RX ADMIN — Medication 10 ML: at 05:58

## 2018-10-16 RX ADMIN — RISPERIDONE 2 MG: 1 TABLET ORAL at 22:23

## 2018-10-16 RX ADMIN — HEPARIN SODIUM 5000 UNITS: 5000 INJECTION INTRAVENOUS; SUBCUTANEOUS at 22:24

## 2018-10-16 NOTE — PROGRESS NOTES
Problem: Mobility Impaired (Adult and Pediatric) Goal: *Acute Goals and Plan of Care (Insert Text) LTG: 
(1.)Mr. Elvia Roldan will move from supine to sit and sit to supine , scoot up and down and roll side to side in bed with MINIMAL ASSIST within 7 treatment day(s). (2.)Mr. Elvia Roldan will transfer from bed to chair and chair to bed with MODERATE ASSIST using the least restrictive device within 7 treatment day(s). (3.)Mr. Elvia Roldan will ambulate with MODERATE ASSIST for >or=10 feet with the least restrictive device, fair dynamic standing balance within 7 treatment day(s). (4.)Mr. Elvia Roldan will tolerate sitting upright in bedside chair x 2-4 hours to increase tolerance for physical activity within 7 treatment days. (5.)Mr. Elvia Roldan will perform B LE therapeutic exercises x 20 reps with MINIMAL ASSIST within 7 days to increase strength for improved safety and independence in transfers and gait. 
________________________________________________________________________________________________ PHYSICAL THERAPY: Daily Note, Treatment Day: 2nd, PM 10/16/2018INPATIENT: Hospital Day: 5 Payor: [facility] / Plan: Ninfa Delcid [place] / Product Type: Managed Care Medicare /  
  
NAME/AGE/GENDER: Dilia Jose is a 75 y.o. male PRIMARY DIAGNOSIS: Recurrent falls Rhabdomyolysis Non-traumatic rhabdomyolysis Non-traumatic rhabdomyolysis ICD-10: Treatment Diagnosis:  
 · Generalized Muscle Weakness (M62.81) · Other lack of cordination (R27.8) · Difficulty in walking, Not elsewhere classified (R26.2) · History of falling (Z91.81) Precaution/Allergies: 
Pcn [penicillins] and Pcn [penicillins] ASSESSMENT:  
Mr. Elvia Roldan was supine in bed upon contact, with wife at bedside. Patient demonstrates lethargy and confusion with delayed/ decreased command following but improved from yesterday.  Patient was Mod A X2 to perform supine>sit and sit>stand with verbal cues to improve safety awareness and proper body alignment. Patient ambulated with RW 36' then 13'' with min-mod assist +chair follow and verbal/manual cues for walker navigation requiring one sitting rest break. Upon return to patients room, he sat EOB to perform LE strengthening exercises with Mod Verbal, Visual, tactile cues to complete task. Patient was returned to supine in bed at the end of therapy with Mod A X2 with cueing for technique. Patient has increased ambulation distance and is progressing slowly towards goals. Will continue efforts. This section established at most recent assessment PROBLEM LIST (Impairments causing functional limitations): 1. Decreased Strength 2. Decreased ADL/Functional Activities 3. Decreased Transfer Abilities 4. Decreased Ambulation Ability/Technique 5. Decreased Balance 6. Decreased Activity Tolerance 7. Decreased Flexibility/Joint Mobility 8. Decreased [place] with Home Exercise Program 
9. Decreased Cognition INTERVENTIONS PLANNED: (Benefits and precautions of physical therapy have been discussed with the patient.) 1. Balance Exercise 2. Bed Mobility 3. Family Education 4. Gait Training 5. Home Exercise Program (HEP) 6. Therapeutic Activites 7. Therapeutic Exercise/Strengthening 8. Transfer Training 9. Group Therapy TREATMENT PLAN: Frequency/Duration: 3 times a week for duration of hospital stayRehabilitation Potential For Stated Goals: Good RECOMMENDED REHABILITATION/EQUIPMENT: (at time of discharge pending progress): Due to the probability of continued deficits (see above) this patient will likely need continued skilled physical therapy after discharge. Equipment:  
? None at this time HISTORY:  
History of Present Injury/Illness (Reason for Referral): 
Per chart: Pt is a 74 yo male with pmh dementia with behavioral disturbances, HTN, CVA, CAD who has had multiple ER visits this year.   He presented to ER today via EMS due to his wife unable to pick him up off floor after fall at home. Pt provides no history of event due to dementia. Wife reports that pt fell around 0230 and she wasn't able to get him up until a family member came to visit hours later, when EMS was eventually called. Pt drowsy on arrival.  Lab work overall okay except CK elevated. Vitals stable. CT head without acute findings. He denies pain at this time. Family is hopeful for d/c to STR due to pt's weakness and recent falls.  
  
Past Medical History/Comorbidities:  
Mr. Zaira Chanel  has a past medical history of CAD (coronary artery disease); Chronic back pain; Dementia (10/12/2018); Hypertension; and Stroke [facility]). He also has no past medical history of Arthritis; Asthma; Autoimmune disease ([address] Cancer ([address] Chronic kidney disease; COPD; DEMENTIA; Diabetes ([address] Endocrine disease; Gastrointestinal disorder; Heart failure ([address] Hypertension; Infectious disease; Liver disease; Other ill-defined conditions(799.89); Psychiatric disorder; PUD (peptic ulcer disease); Seizures ([address] Sleep disorder; or Thromboembolus ([facility] Mr. Zaira Chanel  has a past surgical history that includes hx heent. Social History/Living Environment:  
Home Environment: Private residence # Steps to Enter: 0 Wheelchair Ramp: No 
One/Two Story Residence: One story Living Alone: No 
Support Systems: Spouse/Significant Other/Partner Patient Expects to be Discharged to[de-identified] Rehabilitation facility Current DME Used/Available at Home: Ivonne Ho, matt, Beverly Ganser, rollator Tub or Shower Type: Shower Prior Level of Function/Work/Activity: 
Pt required constant supervision but per wife, pt was able to ambulate with rollator in apartment and in community with only SBA. Pt states there have been no other falls. Pt requires assistance with ADLs. Personal Factors:   
      Sex:  male Age:  75 y.o. Number of Personal Factors/Comorbidities that affect the Plan of Care: 1-2: MODERATE COMPLEXITY EXAMINATION:  
 Most Recent Physical Functioning:  
Gross Assessment: 
  
         
  
Posture: 
  
Balance: 
Sitting: Impaired Sitting - Static: Fair (occasional) Sitting - Dynamic: Fair (occasional) Standing: Impaired Standing - Static: Fair Standing - Dynamic : Poor Bed Mobility: 
Rolling: Moderate assistance Supine to Sit: Moderate assistance Sit to Supine: Moderate assistance;Assist x2 Scooting: Moderate assistance;Assist x2 Interventions: Safety awareness training;Manual cues; Verbal cues Wheelchair Mobility: 
  
Transfers: 
Sit to Stand: Moderate assistance;Assist x2 Stand to Sit: Moderate assistance Interventions: Verbal cues; Safety awareness training;Manual cues Gait: 
  
Base of Support: Narrowed Speed/[place]: Shuffled; Slow Step Length: Right shortened;Left shortened Gait Abnormalities: Decreased step clearance;Shuffling gait; Path deviations Distance (ft):  (40' then 15') Assistive Device: Walker, rolling Ambulation - Level of Assistance: Minimal assistance; Moderate assistance Interventions: Safety awareness training; Tactile cues; Verbal cues;Manual cues Body Structures Involved: 1. Bones 2. Joints 3. Muscles Body Functions Affected: 1. Neuromusculoskeletal 
2. Movement Related Activities and Participation Affected: 1. General Tasks and Demands 2. Mobility 3. Self Care Number of elements that affect the Plan of Care: 4+: HIGH COMPLEXITY CLINICAL PRESENTATION:  
Presentation: Stable and uncomplicated: LOW COMPLEXITY CLINICAL DECISION MAKING:  
[employer] 6 Clicks Basic Mobility Inpatient Short Form How much difficulty does the patient currently have. .. Unable A Lot A Little None 1. Turning over in bed (including adjusting bedclothes, sheets and blankets)? [] 1   [x] 2   [] 3   [] 4  
2.   Sitting down on and standing up from a chair with arms ( e.g., wheelchair, bedside commode, etc.)   [] 1   [x] 2   [] 3   [] 4  
 3. Moving from lying on back to sitting on the side of the bed? [] 1   [x] 2   [] 3   [] 4 How much help from another person does the patient currently need. .. Total A Lot A Little None 4. Moving to and from a bed to a chair (including a wheelchair)? [] 1   [x] 2   [] 3   [] 4  
5. Need to walk in hospital room? [x] 1   [] 2   [] 3   [] 4  
6. Climbing 3-5 steps with a railing? [x] 1   [] 2   [] 3   [] 4  
© 2007, Trustees of [facility], under license to [employer]. All rights reserved Score:  Initial: 10 Most Recent: X (Date: -- ) Interpretation of Tool:  Represents activities that are increasingly more difficult (i.e. Bed mobility, Transfers, Gait). Score 24 23 22-20 19-15 14-10 9-7 6 Modifier CH CI CJ CK CL CM CN   
 
? Mobility - Walking and Moving Around:  
  - CURRENT STATUS: CL - 60%-79% impaired, limited or restricted  - GOAL STATUS: CL - 60%-79% impaired, limited or restricted  - D/C STATUS:  ---------------To be determined--------------- Payor: [facility] [place] [facility] / [place] / Product Type: Managed Care Medicare /   
 
Medical Necessity:    
· Patient demonstrates fair rehab potential due to higher previous functional level. Reason for Services/Other Comments: 
· Patient continues to require present interventions due to patient's inability to function at baseline. Use of outcome tool(s) and clinical judgement create a POC that gives a: Questionable prediction of patient's progress: MODERATE COMPLEXITY  
  
 
 
 
TREATMENT:  
(In addition to Assessment/Re-Assessment sessions the following treatments were rendered) Pre-treatment Symptoms/Complaints:  Confused, delayed command following Pain: Initial:  
Pain Intensity 1: 0  Post Session:  0/10 Therapeutic Activity: (    15minutes):   Therapeutic activities including bed mobility training, static/dynamic sitting/standing balance training, scooting, posture training, transfer training, ambulation on level ground, and patient education  to improve mobility, balance, coordination and tolerance for physical activity. Required moderate verbal, tactile, and manual cues cues Safety awareness training; Tactile cues; Verbal cues;Manual cues to promote static and dynamic balance in standing, promote coordination of bilateral, lower extremity(s) and promote functional independence. Therapeutic Exercise: (  10 Minutes):  Exercises per grid below to improve strength, balance and coordination. Required Mod A  visual, verbal and manual cues to promote proper body alignment, promote proper body posture and promote proper body mechanics. Progressed range, repetitions and complexity of movement as indicated. Date: 
10/16/18 Date: 
 Date: Activity/Exercise Parameters Parameters Parameters AP 10 B AAROM    
LAQ 10 B AAROM Hip Abduction 10 B AAROM Braces/Orthotics/Lines/Etc:  
· IV 
· O2 Device: Room air Treatment/Session Assessment:   
· Response to Treatment:  See above · Interdisciplinary Collaboration:  
o Physical Therapy Assistant 
o Registered Nurse 
o Rehabilitation Attendant 
o SPTA · After treatment position/precautions:  
o Supine in bed 
o Bed alarm/tab alert on 
o Bed/Chair-wheels locked 
o Bed in low position 
o Call light within reach 
o RN notified · Compliance with Program/Exercises: Will assess as treatment progresses. · Recommendations/Intent for next treatment session: \"Next visit will focus on advancements to more challenging activities and reduction in assistance provided\". Total Treatment Duration: PT Patient Time In/Time Out Time In: 3677 Time Out: 6743 Kem Bryan PTA

## 2018-10-16 NOTE — PROGRESS NOTES
Problem: Falls - Risk of 
Goal: *Absence of Falls Document [facility] Fall Risk and appropriate interventions in the flowsheet. Outcome: Progressing Towards Goal 
Fall Risk Interventions: 
Mobility Interventions: Bed/chair exit alarm, Communicate number of staff needed for ambulation/transfer Mentation Interventions: Adequate sleep, hydration, pain control, Bed/chair exit alarm, Door open when patient unattended Medication Interventions: Bed/chair exit alarm Elimination Interventions: Bed/chair exit alarm, Call light in reach History of Falls Interventions: Bed/chair exit alarm Problem: Pressure Injury - Risk of 
Goal: *Prevention of pressure injury Document Ha Scale and appropriate interventions in the flowsheet. Outcome: Progressing Towards Goal 
Pressure Injury Interventions: 
Sensory Interventions: Assess changes in LOC, Keep linens dry and wrinkle-free, Minimize linen layers, Pressure redistribution bed/mattress (bed type) Moisture Interventions: Absorbent underpads, Limit adult briefs, Maintain skin hydration (lotion/cream) Activity Interventions: Increase time out of bed, Pressure redistribution bed/mattress(bed type) Mobility Interventions: HOB 30 degrees or less, Pressure redistribution bed/mattress (bed type) Nutrition Interventions: Document food/fluid/supplement intake, Offer support with meals,snacks and hydration

## 2018-10-16 NOTE — PROGRESS NOTES
Problem: Interdisciplinary Rounds Goal: Interdisciplinary Rounds Outcome: Progressing Towards Goal 
Interdisciplinary team rounds were held 10/16/2018 with the following team members:Care Management, Nurse Practitioner, Physical Therapy and  and the patient and family. Anticipate discharge to [facility] pending insurance approval. 
 
Plan of care discussed. See clinical pathway and/or care plan for interventions and desired outcomes.

## 2018-10-16 NOTE — PROGRESS NOTES
1. Patient will complete upper body bathing and dressing with mod A and adaptive equipment as needed. 2. Patient will complete toileting with min A.  
3. Patient will tolerate 23 minutes of OT treatment with 2-3 rest breaks to increase activity tolerance for ADLs. 4. Patient will complete functional transfers with min A and adaptive equipment as needed. 5. Patient will complete self-grooming with supervision after set-up. Timeframe: 7 visits OCCUPATIONAL THERAPY: Daily Note, Treatment Day: 1st and AM  
 10/16/2018INPATIENT: Hospital Day: 5 Payor: [facility] [place] MED[facility]ARE / Plan: [facility] / Product Type: Managed Care Medicare /  
  
NAME/AGE/GENDER: Toir Guerrier is a 75 y.o. male PRIMARY DIAGNOSIS:  Recurrent falls Rhabdomyolysis Non-traumatic rhabdomyolysis Non-traumatic rhabdomyolysis ICD-10: Treatment Diagnosis:  
 · Generalized Muscle Weakness (M62.81) · Other lack of cordination (R27.8) · History of falling (Z91.81) Precautions/Allergies: 
   Pcn [penicillins] and Pcn [penicillins] ASSESSMENT:  
Mr. La Hill presents for the above. 10/16/2018 Pt presents in supine upon arrival. Pt agreeable to treatment and transferred from supine to sit with mod assist x's 2, sit to stand with mod assist x's 2 and a rolling walker, and completed several steps to the chair with mod/max a x's 2 with a rolling walker. Pt was positioned up in the chair and participated in exercises with active assist, assistance mainly required due to pt's decreased command following. Pt was left up in the chair with belongings in reach and posey on. Fair effort. Continue POC. This section established at most recent assessment PROBLEM LIST (Impairments causing functional limitations): 1. Decreased Strength 2. Decreased ADL/Functional Activities 3. Decreased Transfer Abilities 4. Decreased Ambulation Ability/Technique 5. Decreased Balance 6. Decreased Activity Tolerance 7. Decreased Cognition INTERVENTIONS PLANNED: (Benefits and precautions of occupational therapy have been discussed with the patient.) 1. Activities of daily living training 2. Adaptive equipment training 3. Balance training 4. Clothing management 5. Cognitive training 6. Community reintergration 7. Donning&doffing training 8. Re-evaluation 9. Therapeutic activity 10. Therapeutic exercise TREATMENT PLAN: Frequency/Duration: Follow patient 3x/week to address above goals. Rehabilitation Potential For Stated Goals: Fair RECOMMENDED REHABILITATION/EQUIPMENT: (at time of discharge pending progress): Due to the probability of continued deficits (see above) this patient will likely need continued skilled occupational therapy after discharge. Equipment: ? TBD  
    
 
 
 
OCCUPATIONAL PROFILE AND HISTORY:  
History of Present Injury/Illness (Reason for Referral): 
See h&P Past Medical History/Comorbidities:  
Mr. Erin Davis  has a past medical history of CAD (coronary artery disease); Chronic back pain; Dementia (10/12/2018); Hypertension; and Stroke [facility]). He also has no past medical history of Arthritis; Asthma; Autoimmune disease ([address] Cancer ([address] Chronic kidney disease; COPD; DEMENTIA; Diabetes ([address] Endocrine disease; Gastrointestinal disorder; Heart failure ([address] Hypertension; Infectious disease; Liver disease; Other ill-defined conditions(799.89); Psychiatric disorder; PUD (peptic ulcer disease); Seizures ([address] Sleep disorder; or Thromboembolus ([address] Mr. Erin Davis  has a past surgical history that includes hx heent. Social History/Living Environment:  
Home Environment: Private residence # Steps to Enter: 0 Wheelchair Ramp: No 
One/Two Story Residence: One story Living Alone: No 
Support Systems: Spouse/Significant Other/Partner Patient Expects to be Discharged to[de-identified] Rehabilitation facility Current DME Used/Available at Home: Jory Latch, straight, Scharlene Minors, rollator Tub or Shower Type: Shower Prior Level of Function/Work/Activity: 
Assistance with ADls; use of RW for functional mobility. Personal Factors:   
      Sex:  male Age:  75 y.o. Past/Current Experience: Altered mental status, hx of falls Other factors that influence how disability is experienced by the patient:  Multiple co-morbidities Number of Personal Factors/Comorbidities that affect the Plan of Care: Expanded review of therapy/medical records (1-2):  MODERATE COMPLEXITY ASSESSMENT OF OCCUPATIONAL PERFORMANCE[de-identified]  
Activities of Daily Living:  
Basic ADLs (From Assessment) Complex ADLs (From Assessment) Feeding: Minimum assistance Oral Facial Hygiene/Grooming: Minimum assistance Bathing: Maximum assistance Upper Body Dressing: Minimum assistance Lower Body Dressing: Maximum assistance Toileting: Maximum assistance Instrumental ADL Meal Preparation: Total assistance Homemaking: Total assistance Grooming/Bathing/Dressing Activities of Daily Living Bed/Mat Mobility Rolling: Moderate assistance Supine to Sit: Moderate assistance;Maximum assistance;Assist x2 Sit to Stand: Moderate assistance;Assist x2 Bed to Chair: Moderate assistance;Maximum assistance;Assist x2 Scooting: Total assistance Most Recent Physical Functioning:  
Gross Assessment: 
  
         
  
Posture: 
Posture (WDL): Exceptions to [facility] Posture Assessment: Forward head, Rounded shoulders Balance: 
Sitting: Impaired Sitting - Static: Fair (occasional) Sitting - Dynamic: Fair (occasional) Standing: Impaired Standing - Static: Constant support;Poor Standing - Dynamic : Poor Bed Mobility: 
Rolling: Moderate assistance Supine to Sit: Moderate assistance;Maximum assistance;Assist x2 Scooting: Total assistance Wheelchair Mobility: 
  
Transfers: 
Sit to Stand: Moderate assistance;Assist x2 Stand to Sit: Moderate assistance;Assist x2 Bed to Chair: Moderate assistance;Maximum assistance;Assist x2  
 
 Patient Vitals for the past 6 hrs: 
 BP BP Patient Position SpO2 Pulse 10/16/18 0725 (!) 139/91 At rest 100 % 79  
10/16/18 1055 124/81 Sitting 100 % 62 Mental Status Neurologic State: Alert Orientation Level: Oriented to person Cognition: Decreased attention/concentration, Follows commands Perception: Cues to maintain midline in standing Perseveration: No perseveration noted Safety/Judgement: Decreased awareness of environment, Fall prevention Physical Skills Involved: 
1. Balance 2. Strength 3. Activity Tolerance 4. Gross Motor Control Cognitive Skills Affected (resulting in the inability to perform in a timely and safe manner): 1. Perception 2. Executive Function 3. Immediate Memory 4. Short Term Recall 5. Long Term Memory 6. Sustained Attention 7. Divided Attention 8. Comprehension 9. Expression Psychosocial Skills Affected: 1. Habits/Routines 2. Environmental Adaptation Number of elements that affect the Plan of Care: 5+:  HIGH COMPLEXITY CLINICAL DECISION MAKIN[address] [place] [id number] AM-PAC 6 Clicks Daily Activity Inpatient Short Form How much help from another person does the patient currently need. .. Total A Lot A Little None 1. Putting on and taking off regular lower body clothing? [] 1   [x] 2   [] 3   [] 4  
2. Bathing (including washing, rinsing, drying)? [] 1   [x] 2   [] 3   [] 4  
3. Toileting, which includes using toilet, bedpan or urinal?   [] 1   [x] 2   [] 3   [] 4  
4. Putting on and taking off regular upper body clothing? [] 1   [] 2   [x] 3   [] 4  
5. Taking care of personal grooming such as brushing teeth? [] 1   [] 2   [x] 3   [] 4  
6. Eating meals? [] 1   [] 2   [x] 3   [] 4  
© , Trustees of [address] [place] [address] [address] [address], under license to [employer]. All rights reserved Score:  Initial: 15 Most Recent: X (Date: -- ) Interpretation of Tool:  Represents activities that are increasingly more difficult (i.e. Bed mobility, Transfers, Gait). Score 24 23 22-20 19-15 14-10 9-7 6 Modifier CH CI CJ CK CL CM CN   
 
? Self Care:  
  - CURRENT STATUS: CK - 40%-59% impaired, limited or restricted  - GOAL STATUS: CJ - 20%-39% impaired, limited or restricted  - D/C STATUS:  ---------------To be determined--------------- Payor: [facility] [place] / [place] [facility] [place] MEDIC[facility] HMO/PPO / Product Type: Managed Care Medicare /   
 
Medical Necessity:    
· Patient is expected to demonstrate progress in strength, balance, coordination and functional technique to decrease assistance required with ADLs and functional mobility. Wyatt Pereira Reason for Services/Other Comments: 
· Patient continues to require skilled intervention due to medical complications and patient unable to attend/participate in therapy as expected. Use of outcome tool(s) and clinical judgement create a POC that gives a: MODERATE COMPLEXITY  
 
 
 
TREATMENT:  
(In addition to Assessment/Re-Assessment sessions the following treatments were rendered) Pre-treatment Symptoms/Complaints:  No complaints Pain: Initial:  
Pain Intensity 1: 0 /10 Post Session:  same Therapeutic Activity: (13 minutes): Therapeutic activities including Bed transfers, Chair transfers, sitting edge of bed,  and static/dynamic standing  to improve mobility, strength, balance, coordination and dynamic movement of leg - bilateral to improve functional transfers. Required moderate/maximal assist to promote static and dynamic balance in standing. Therapeutic Exercise: (10 minutes):  Exercises per grid below to improve mobility and strength. Required maximal visual, verbal and manual cues to promote proper body posture and promote proper body mechanics. Progressed range as indicated. Date: 
10/16/18 Date: 
 Date: Activity/Exercise Parameters Parameters Parameters Shoulder flexion/extension 10 reps each arm Shoulder horizontal add/abb 10 reps each arm Punches 10 reps each arm Elbow flexion/extension 10 reps each arm Braces/Orthotics/Lines/Etc:  
· IV 
· O2 Device: Room air Treatment/Session Assessment:   
· Response to Treatment:  Fair · Interdisciplinary Collaboration:  
o Certified Occupational Therapy Assistant 
o Registered Nurse 
o Rehabilitation Attendant · After treatment position/precautions:  
o Up in chair 
o Bed alarm/tab alert on 
o Bed/Chair-wheels locked 
o Call light within reach 
o RN notified · Compliance with Program/Exercises: Will assess as treatment progresses. · Recommendations/Intent for next treatment session: \"Next visit will focus on advancements to more challenging activities and reduction in assistance provided\". Total Treatment Duration: OT Patient Time In/Time Out Time In: 1000 Time Out: 1023 Edger Bare

## 2018-10-16 NOTE — PROGRESS NOTES
Problem: Dysphagia (Adult) Goal: *Acute Goals and Plan of Care (Insert Text) ST. Pt. Will tolerate mech soft/thin liquids with no overt s/sx of aspiration/penetration. Goal revised. 2. Patient will tolerate trial upgrades with SLP with adequate oral clearance with 80% accuracy. Goal met. LTG: Pt. Will tolerate least restrictive diet without respiratory decline. Speech language pathology: observation bedside swallow note: Daily Note and Discharge NAME/AGE/GENDER: Heath Cha is a 75 y.o. male DATE: 10/16/2018 PRIMARY DIAGNOSIS: Recurrent falls Rhabdomyolysis ICD-10: Treatment Diagnosis: R13.12 Dysphagia, Oropharyngeal Phase INTERDISCIPLINARY COLLABORATION: Registered Nurse PRECAUTIONS/ALLERGIES: Pcn [penicillins] and Pcn [penicillins] ASSESSMENT:Pt seen for diet tolerance. Pt sitting upright in bed feeding himself breakfast.  Increased mastication time observed but adequate oral clearance. No signs/sx aspiration observed. Pt reported liking the mech soft diet better. Recommend continuing with mech soft with no further ST indicated. ?????? ? ? This section established at most recent assessment?????????? 
PROBLEM LIST (Impairments causing functional limitations): 1. Dysphagia/pocketing REHABILITATION POTENTIAL FOR STATED GOALS: Fair PLAN OF CARE:  
Patient will benefit from skilled intervention to address the following impairments. RECOMMENDATIONS AND PLANNED INTERVENTIONS (Benefits and precautions of therapy have been discussed with the patient.): 
· PO:  Mechanical soft with chopped meat and vegetables · Liquids:  regular thin MEDICATIONS: 
· With liquid COMPENSATORY STRATEGIES/MODIFICATIONS INCLUDING: 
· Small sips and bites RECOMMENDED DIET MODIFICATIONS DISCUSSED WITH: 
· Nursing · Patient RECOMMENDED REHABILITATION/EQUIPMENT: (at time of discharge pending progress): Due to the probability of continued deficits (see above) this patient will not likely need continued skilled speech therapy after discharge. SUBJECTIVE:  
Pt sitting upright in bed feeding himself. History of Present Injury/Illness: Mr. Tevin Narayanan  has a past medical history of CAD (coronary artery disease); Chronic back pain; Dementia (10/12/2018); Hypertension; and Stroke [facility]). He also has no past medical history of Arthritis; Asthma; Autoimmune disease ([facility] Cancer ([facility] Chronic kidney disease; COPD; DEMENTIA; Diabetes ([facility] Endocrine disease; Gastrointestinal disorder; Heart failure ([facility] Hypertension; Infectious disease; Liver disease; Other ill-defined conditions(799.89); Psychiatric disorder; PUD (peptic ulcer disease); Seizures ([facility] Sleep disorder; or Thromboembolus ([facility] .  He also  has a past surgical history that includes hx heent. Present Symptoms:   
Pain Intensity 1: 0 Pain Location 1: Back Current Medications: No current facility-administered medications on file prior to encounter. Current Outpatient Prescriptions on File Prior to Encounter Medication Sig Dispense Refill  omeprazole (PRILOSEC OTC) 20 mg tablet Take 20 mg by mouth daily. 31 Tab 2  
 memantine (NAMENDA) 10 mg tablet Take 10 mg by mouth daily.  amLODIPine (NORVASC) 10 mg tablet Take 5 mg by mouth daily. Current Dietary Status:   
 Regular/thin History of reflux:  YES  
? Reflux medication:prilosec Social History/Home Situation:   
Home Environment: Private residence # Steps to Enter: 0 Wheelchair Ramp: No 
One/Two Story Residence: One story Living Alone: No 
Support Systems: Spouse/Significant Other/Partner Patient Expects to be Discharged to[de-identified] Rehabilitation facility Current DME Used/Available at Home: Janet George, straight, Yao Calvin, rollator Tub or Shower Type: Shower OBJECTIVE:  
Respiratory Status: CXR Results:n/a 
CT Results:Impression: Chronic appearing changes. Oral Motor Structure/Speech:  Oral-Motor Structure/Motor Speech Labial: Decreased rate, Decreased seal 
Dentition: Limited Oral Hygiene:  (food residue from AM meal) Lingual: Decreased rate, Decreased strength Cognitive and Communication Status: 
Neurologic State: Alert BEDSIDE SWALLOW EVALUATIONOral Assessment: P.O. Trials: 
Patient Position: sitting upright in bed The patient was given bite/sip amounts of the following:  
Consistency Presented: Mechanical soft; Thin liquid How Presented: Self-fed/presented;Spoon;Straw;Successive swallows ORAL PHASE: 
Bolus Acceptance: No impairment Bolus Formation/Control: Impaired Propulsion: No impairment Type of Impairment: Mastication Oral Residue: None PHARYNGEAL PHASE: 
Initiation of Swallow: No impairment Aspiration Signs/Symptoms: None Vocal Quality: No impairment OTHER OBSERVATIONS: 
Rate/bite size: Impaired Endurance:  Impaired and Questionable Comments: 1:1 assist/supervision Tool Used: Dysphagia Outcome and Severity Scale (SHABANA) Score Comments Normal Diet  [] 7 With no strategies or extra time needed Functional Swallow  [] 6 May have mild oral or pharyngeal delay Mild Dysphagia 
  [] 5 Which may require one diet consistency restricted (those who demonstrate penetration which is entirely cleared on MBS would be included) Mild-Moderate Dysphagia  [] 4 With 1-2 diet consistencies restricted Moderate Dysphagia  [x] 3 With 2 or more diet consistencies restricted Moderately Severe Dysphagia  [] 2 With partial PO strategies (trials with ST only) Severe Dysphagia  [] 1 With inability to tolerate any PO safely Score:  Initial: 3 Most Recent: 5 (Date: 10/16/18 ) Interpretation of Tool: The Dysphagia Outcome and Severity Scale (SHABANA) is a simple, easy-to-use, 7-point scale developed to systematically rate the functional severity of dysphagia based on objective assessment and make recommendations for diet level, independence level, and type of nutrition. Score 7 6 5 4 3 2 1 Modifier CH CI CJ CK CL CM CN ? Swallowing:  
  - CURRENT STATUS: CL - 60%-79% impaired, limited or restricted  - GOAL STATUS:  CJ - 20%-39% impaired, limited or restricted  - D/C STATUS:  ---------------To be determined--------------- Payor: [facility] [place] [facility] / Plan[place] [employer] [place] / Product Type: Managed Care Medicare /  
 
TREATMENT:  
 (In addition to Assessment/Re-Assessment sessions the following treatments were rendered) Dysphagia Activities: Activities/Procedures listed utilized to improve progress in diet tolerance. Required no cueing to improve swallow safety and work toward diet advancement. 
 
 
__________________________________________________________________________________________________ Safety: After treatment position/precautions: 
· Call light within reach · RN notified · Upright in Bed Total Treatment Duration: 
Time In: 5768 Time Out: 1734 [address] CCC-SLP

## 2018-10-16 NOTE — PROGRESS NOTES
Hospitalist Progress Note 10/16/2018 Admit Date: 10/12/2018 10:42 AM  
NAME: Tori Guerrier :  1945 MRN:  [id number] Attending: Renan Jones MD 
PCP:  PROVIDER UNKNOWN 
 
SUBJECTIVE:  
 
Pt is a 76 yo male with pmh dementia with behavioral disturbances, HTN, CVA, CAD who has had multiple ER visits this year. He presented to ER 10/12 via EMS due to his wife unable to pick him up off floor after fall at home. Lab work overall okay except CK elevated. He was started on IV fluids and his sedating meds were held. Pt has clinically improved, CK now down to 900s. He is ready for d/c to [facility] but waiting for bed placement. Review of Systems negative with exception of pertinent positives noted above PHYSICAL EXAM  
 
Visit Vitals  /82 (BP 1 Location: Left arm, BP Patient Position: Sitting)  Pulse 66  Temp 98.5 °F (36.9 °C)  Resp 18  Ht 5' 8\" (1.727 m)  Wt 77.1 kg (170 lb)  SpO2 93%  BMI 25.85 kg/m2 Temp (24hrs), Av.1 °F (36.7 °C), Min:97.9 °F (36.6 °C), Max:98.5 °F (36.9 °C) Oxygen Therapy O2 Sat (%): 93 % (10/16/18 1311) Pulse via Oximetry: 93 beats per minute (10/16/18 1311) O2 Device: Room air (10/16/18 1311) Intake/Output Summary (Last 24 hours) at 10/16/18 1429 Last data filed at 10/16/18 1321 Gross per 24 hour Intake              480 ml Output                0 ml Net              480 ml General: No acute distress, pleasantly confused, cooperative    
Lungs:  CTA Bilaterally. Heart:  Regular rate and rhythm,  No murmur, rub, or gallop Abdomen: Soft, Non distended, Non tender, Positive bowel sounds Extremities: No cyanosis, clubbing or edema Neurologic:  No focal deficits ASSESSMENT Active Hospital Problems Diagnosis Date Noted  Non-traumatic rhabdomyolysis 10/13/2018  Rhabdomyolysis 10/13/2018  Recurrent falls 10/12/2018  Dementia 10/12/2018 A/P: 
 
 Rhabdomyolysis- Improving, cont to trend CK. Cont IV fluids. Dementia with behavioral disturbances- Cont home regimen Aricept, Namenda, Risperdal, Trazadone. DC planning- Medically ready for d/c, waiting for insurance pre cert to finish. DVT Prophylaxis: Heparin Signed By: Bertha Garcia NP October 16, 2018

## 2018-10-17 VITALS
DIASTOLIC BLOOD PRESSURE: 75 MMHG | OXYGEN SATURATION: 98 % | HEART RATE: 63 BPM | TEMPERATURE: 98.6 F | BODY MASS INDEX: 25.76 KG/M2 | WEIGHT: 170 LBS | HEIGHT: 68 IN | RESPIRATION RATE: 18 BRPM | SYSTOLIC BLOOD PRESSURE: 117 MMHG

## 2018-10-17 LAB
CK SERPL-CCNC: 521 U/L (ref 21–215)
GLUCOSE BLD STRIP.AUTO-MCNC: 118 MG/DL (ref 65–100)
GLUCOSE BLD STRIP.AUTO-MCNC: 80 MG/DL (ref 65–100)

## 2018-10-17 PROCEDURE — 82962 GLUCOSE BLOOD TEST: CPT

## 2018-10-17 PROCEDURE — 74011000250 HC RX REV CODE- 250: Performed by: INTERNAL MEDICINE

## 2018-10-17 PROCEDURE — 82550 ASSAY OF CK (CPK): CPT

## 2018-10-17 PROCEDURE — 97110 THERAPEUTIC EXERCISES: CPT

## 2018-10-17 PROCEDURE — 77030020263 HC SOL INJ SOD CL0.9% LFCR 1000ML

## 2018-10-17 PROCEDURE — 36415 COLL VENOUS BLD VENIPUNCTURE: CPT

## 2018-10-17 PROCEDURE — 97530 THERAPEUTIC ACTIVITIES: CPT

## 2018-10-17 PROCEDURE — 74011250636 HC RX REV CODE- 250/636: Performed by: INTERNAL MEDICINE

## 2018-10-17 PROCEDURE — 74011250637 HC RX REV CODE- 250/637: Performed by: INTERNAL MEDICINE

## 2018-10-17 PROCEDURE — 74011250637 HC RX REV CODE- 250/637: Performed by: NURSE PRACTITIONER

## 2018-10-17 RX ORDER — AMOXICILLIN 250 MG
2 CAPSULE ORAL
Qty: 60 TAB | Refills: 0 | Status: SHIPPED | OUTPATIENT
Start: 2018-10-17

## 2018-10-17 RX ORDER — TRAZODONE HYDROCHLORIDE 100 MG/1
200 TABLET ORAL
Qty: 30 TAB | Refills: 0 | Status: SHIPPED | OUTPATIENT
Start: 2018-10-17

## 2018-10-17 RX ORDER — METOPROLOL TARTRATE 25 MG/1
25 TABLET, FILM COATED ORAL DAILY
Qty: 30 TAB | Refills: 0 | Status: SHIPPED
Start: 2018-10-18

## 2018-10-17 RX ADMIN — MEMANTINE HYDROCHLORIDE 10 MG: 5 TABLET ORAL at 08:00

## 2018-10-17 RX ADMIN — SODIUM CHLORIDE 150 ML/HR: 900 INJECTION, SOLUTION INTRAVENOUS at 06:34

## 2018-10-17 RX ADMIN — HEPARIN SODIUM 5000 UNITS: 5000 INJECTION INTRAVENOUS; SUBCUTANEOUS at 13:07

## 2018-10-17 RX ADMIN — METOPROLOL TARTRATE 25 MG: 25 TABLET ORAL at 08:00

## 2018-10-17 RX ADMIN — AMLODIPINE BESYLATE 10 MG: 10 TABLET ORAL at 08:00

## 2018-10-17 RX ADMIN — HEPARIN SODIUM 5000 UNITS: 5000 INJECTION INTRAVENOUS; SUBCUTANEOUS at 06:32

## 2018-10-17 RX ADMIN — Medication 10 ML: at 13:08

## 2018-10-17 RX ADMIN — DONEPEZIL HYDROCHLORIDE 10 MG: 5 TABLET, FILM COATED ORAL at 08:00

## 2018-10-17 RX ADMIN — POLYETHYLENE GLYCOL 3350 17 G: 17 POWDER, FOR SOLUTION ORAL at 08:00

## 2018-10-17 NOTE — PROGRESS NOTES
TRANSFER - OUT REPORT: 
 
Verbal report given to Jonny Evans RN(name) on [place] Jiménez  being transferred to [place] [phone](unit) for routine progression of care Report consisted of patients Situation, Background, Assessment and  
Recommendations(SBAR). Information from the following report(s) SBAR, Kardex, ED Summary, MAR, Accordion and Recent Results was reviewed with the receiving nurse. Lines:    
 
Opportunity for questions and clarification was provided. Patient transported with: 
 [facility]

## 2018-10-17 NOTE — PROGRESS NOTES
Problem: Falls - Risk of 
Goal: *Absence of Falls Document Yesica Elizondo Fall Risk and appropriate interventions in the flowsheet. Outcome: Progressing Towards Goal 
Fall Risk Interventions: 
Mobility Interventions: Bed/chair exit alarm, OT consult for ADLs, Patient to call before getting OOB, PT Consult for mobility concerns, PT Consult for assist device competence, Strengthening exercises (ROM-active/passive), Utilize walker, cane, or other assistive device Mentation Interventions: Adequate sleep, hydration, pain control, Bed/chair exit alarm, Door open when patient unattended, More frequent rounding, Reorient patient, Room close to nurse's station, Toileting rounds, Update white board Medication Interventions: Assess postural VS orthostatic hypotension, Bed/chair exit alarm, Patient to call before getting OOB, Teach patient to arise slowly Elimination Interventions: Bed/chair exit alarm, Call light in reach, Patient to call for help with toileting needs, Toilet paper/wipes in reach History of Falls Interventions: Bed/chair exit alarm, Door open when patient unattended, Room close to nurse's station Problem: Pressure Injury - Risk of 
Goal: *Prevention of pressure injury Document Ha Scale and appropriate interventions in the flowsheet. Outcome: Progressing Towards Goal 
Pressure Injury Interventions: 
Sensory Interventions: Assess changes in LOC, Check visual cues for pain, Turn and reposition approx. every two hours (pillows and wedges if needed) Moisture Interventions: Absorbent underpads, Apply protective barrier, creams and emollients, Limit adult briefs, Maintain skin hydration (lotion/cream), Moisture barrier, Minimize layers Activity Interventions: Increase time out of bed, Pressure redistribution bed/mattress(bed type), PT/OT evaluation Mobility Interventions: HOB 30 degrees or less, Pressure redistribution bed/mattress (bed type), PT/OT evaluation Nutrition Interventions: Document food/fluid/supplement intake

## 2018-10-17 NOTE — PROGRESS NOTES
Pt discharged from [facility] to [facility]. Pt left via [facility] ambulance service.

## 2018-10-17 NOTE — DISCHARGE SUMMARY
Hospitalist Discharge Summary     Admit Date:  10/12/2018 10:42 AM   Name:  Armand Kocher   Age:  75 y.o.  :  1945   MRN:  [id number]   PCP:  Unknown, Provider  Treatment Team: Attending Provider: Curtis Gallardo MD; Utilization Review: Helen Shelton; Care Manager: Marina Ferris Speech Language Pathologist: Tracy Coyne, SLP; Utilization Review: Meghan Burton RN, ALAN Chan-C    PROBLEMS:   Fall without apparent injury, unknown time down  Rhabdomyolysis   Recurrent falls   Worsening vascular Dementia with wandering:  [facility] gerontology and Neurology  Hypertension on meds  Sleep apnea:  No CPAP  CAD   History of stroke with mild left side residual deficit: at baseline    Hospital Course:  Patient presented to ER 10/12 after falling approximately 8 hours prior to presentation, unwitnessed:  Syncope vs mechanical fall.  Apparently was on floor for unclear length of time prior to wife discovering him and was lethargic at that time.  Incontinent but is at baseline. CK on admission:  3078. Wife reports left hip and head injury when fell. Baseline also ambulates with walker, able to do same hours after fall.  Wife reports frequent falls of late with advancing dementia and declining speech, gait, cognition, and falls.  He has recently begun wandering in and outside of the house. Admitted with remote telemetry, unremarkable Head CT, no fracture.  Creatinine elevated, SLP, PT, OT, consulted.  CM working on STR and possible SNF.  10/13:  SLP in with findings of pocketing solid foods, puree recommended wait until more alert.  Remains drowsy. Changed to inpatient with rhabdomyolysis. CK increasing, fluids increased.  PT eval reveals standing for one minute only, then fatigued and has to sit down.  Wife informs PT that patient was able to walk around downtown a month ago.    10/14:  Old records include diagnosis of vascular dementia as far back as . Isaiah Ahuja from  Gerontology indicates confusion, agitation, hallucinations, inability to follow simple directions, falls, behavior changes, falls, urinary incontinence, wandering >1 year, insomnia He sees Dr Renetta Camejo, Neurology and Geriatric medicine.  Medication review:  Patient on multiple sedatives including:  Depakote, risperdal, remeron, trazodone.  Additionally on namenda, aricept.  No family in room at present.  Patient pleasantly confused, unable to follow commands. Oriented to self only.  Feeding self with poor po intake. PPD with 17 mm induration, CXR below. CK down to 2344 today.    10/15:  No family present at the time of rounds. Mentation remains impaired. Pleasant and cooperative. Marcie Harris: 4117. Up with PT, unsteady on feet. CM attempting to place patient. SLP in with recommendations for mechanical soft with thin liquids. Pocketing food at times. On 10/16, , ready for Rehab. Mentation a little clearer of multiple seditives. Follow up instructions and discharge meds at bottom of this note. Plan was discussed with RN, CM, Care team and family. Patient was stable at time of discharge. Diagnostic Imaging/Tests:   LEFT ANKLE: 3 images are presented. Lateral view shows a small possible avulsion off of the dorsal tip of the distal talus. There is soft tissue swelling medially and laterally. Impression: Medial and lateral soft tissue swelling, questionable tiny avulsion off of the dorsal side of the distal talus may just be an accessory ossicle.     LEFT HIP:  3 images are presented. No fracture or dislocation is seen. Joint spaces appear well maintained and symmetric. There is been worsening of degenerative changes in the spine. Impression: Worsened degenerative change in the spine, Normal x-ray hips, If avascular necrosis is suspected, MRI would be recommended.      CT HEAD:  Atrophic changes are again seen.  There are no abnormal intracranial masses, mass effect, nor extra-axial collections seen.  There are no abnormal areas of attenuation that would indicate a recent intracranial hemorrhage or infarction.  Bone windows show no fractures nor foreign bodies.    Impression: Chronic appearing changes.       10/14:  CXR: Upright AP portable chest at 7:04 PM. Lungs are mildly underventilated. No new airspace consolidation nor edema. No overt effusions. The cardiac silhouette is stable. No specific signs to suggest active tuberculosis. IMPRESSION:  No acute abnormality.     Labs: Results:       BMP, Mg, Phos Recent Labs     10/15/18  1247 10/15/18  0518    142   K 3.6 3.4*   * 108*   CO2 25 26   AGAP 8 8   BUN 8 7*   CREA 1.31 1.42   CA 8.4 8.4   * 82   MG 2.0  --       CBC Recent Labs     10/15/18  1247   WBC 3.8*   RBC 4.12*   HGB 12.6*   HCT 37.3*         Cardiac Testing Lab Results   Component Value Date/Time    BNP 12 01/19/2012 02:34 PM     (H) 10/17/2018 05:31 AM     (H) 10/16/2018 04:56 AM    CK 1,502 (H) 10/15/2018 12:47 PM    Troponin-I <0.05 01/19/2012 05:59 PM    Troponin-I <0.05 01/19/2012 02:34 PM    Troponin-I, Qt. <0.02 (L) 10/16/2018 02:01 PM    Troponin-I, Qt. <0.02 (L) 06/22/2018 11:38 PM    Troponin-I, Qt. <0.02 (L) 04/08/2018 02:06 PM      Coagulation Tests Lab Results   Component Value Date/Time    Prothrombin time 10.6 02/01/2017 04:55 PM    INR 1.0 02/01/2017 04:55 PM    aPTT 30.5 02/01/2017 04:55 PM      Most Recent UA Lab Results   Component Value Date/Time    Color YELLOW 10/12/2018 02:57 PM    Appearance CLEAR 10/12/2018 02:57 PM    Specific gravity 1.021 10/12/2018 02:57 PM    pH (UA) 7.0 10/12/2018 02:57 PM    Protein NEGATIVE  10/12/2018 02:57 PM    Glucose NEGATIVE  10/12/2018 02:57 PM    Ketone NEGATIVE  10/12/2018 02:57 PM    Bilirubin NEGATIVE  10/12/2018 02:57 PM    Blood NEGATIVE  10/12/2018 02:57 PM    Urobilinogen 1.0 10/12/2018 02:57 PM    Nitrites NEGATIVE  10/12/2018 02:57 PM    Leukocyte Esterase NEGATIVE  10/12/2018 02:57 PM    WBC 0-3 01/23/2017 03:00 PM    RBC 0-3 01/23/2017 03:00 PM    Epithelial cells 0 01/23/2017 03:00 PM    Bacteria 0 01/23/2017 03:00 PM    Casts 0-3 01/23/2017 03:00 PM        Allergies   Allergen Reactions    Pcn [Penicillins] Not Reported This Time    Pcn [Penicillins] Unknown (comments)     Immunization History   Administered Date(s) Administered    TB Skin Test (PPD) Intradermal 10/12/2018    TDAP Vaccine 09/06/2012     All Labs from Last 24 Hrs:  Recent Results (from the past 24 hour(s))   EKG, 12 LEAD, SUBSEQUENT    Collection Time: 10/16/18  1:42 PM   Result Value Ref Range    Ventricular Rate 70 BPM    Atrial Rate 70 BPM    P-R Interval 152 ms    QRS Duration 74 ms    Q-T Interval 414 ms    QTC Calculation (Bezet) 447 ms    Calculated P Axis 62 degrees    Calculated R Axis 27 degrees    Calculated T Axis 47 degrees    Diagnosis       Normal sinus rhythm  Low voltage QRS  Borderline ECG  When compared with ECG of 12-OCT-2018 10:52,  No significant change was found  Confirmed by Pattie Forbes MD (), SUZANNE CONWAY ([id number]) on 10/16/2018 1:55:36 PM     TROPONIN I    Collection Time: 10/16/18  2:01 PM   Result Value Ref Range    Troponin-I, Qt. <0.02 (L) 0.02 - 0.05 NG/ML   GLUCOSE, POC    Collection Time: 10/16/18  3:49 PM   Result Value Ref Range    Glucose (POC) 88 65 - 100 mg/dL   GLUCOSE, POC    Collection Time: 10/16/18  8:50 PM   Result Value Ref Range    Glucose (POC) 109 (H) 65 - 100 mg/dL   CK    Collection Time: 10/17/18  5:31 AM   Result Value Ref Range     (H) 21 - 215 U/L   GLUCOSE, POC    Collection Time: 10/17/18  6:45 AM   Result Value Ref Range    Glucose (POC) 80 65 - 100 mg/dL   GLUCOSE, POC    Collection Time: 10/17/18 11:22 AM   Result Value Ref Range    Glucose (POC) 118 (H) 65 - 100 mg/dL     Current Med List in Hospital:   Current Facility-Administered Medications   Medication Dose Route Frequency    donepezil (ARICEPT) tablet 10 mg  10 mg Oral DAILY    metoprolol tartrate (LOPRESSOR) tablet 25 mg  25 mg Oral DAILY    memantine (NAMENDA) tablet 10 mg  10 mg Oral DAILY    risperiDONE (RisperDAL) tablet 2 mg  2 mg Oral QHS    traZODone (DESYREL) tablet 200 mg  200 mg Oral QHS    sodium chloride (NS) flush 5-10 mL  5-10 mL IntraVENous Q8H    sodium chloride (NS) flush 5-10 mL  5-10 mL IntraVENous PRN    acetaminophen (TYLENOL) tablet 650 mg  650 mg Oral Q4H PRN    ondansetron (ZOFRAN) injection 4 mg  4 mg IntraVENous Q4H PRN    heparin (porcine) injection 5,000 Units  5,000 Units SubCUTAneous Q8H    0.9% sodium chloride infusion  150 mL/hr IntraVENous CONTINUOUS    haloperidol lactate (HALDOL) injection 2 mg  2 mg IntraVENous Q6H PRN    polyethylene glycol (MIRALAX) packet 17 g  17 g Oral DAILY    senna-docusate (PERICOLACE) 8.6-50 mg per tablet 2 Tab  2 Tab Oral QHS    hydrALAZINE (APRESOLINE) 20 mg/mL injection 10 mg  10 mg IntraVENous Q6H PRN    amLODIPine (NORVASC) tablet 10 mg  10 mg Oral DAILY    influenza vaccine 2018-19 (6 mos+)(PF) (FLUARIX QUAD/FLULAVAL QUAD) injection 0.5 mL  0.5 mL IntraMUSCular PRIOR TO DISCHARGE       Discharge Exam:  Patient Vitals for the past 24 hrs:   Temp Pulse Resp BP SpO2   10/17/18 1132 98.6 °F (37 °C) 63 18 117/75 98 %   10/17/18 0743 98.1 °F (36.7 °C) 69 18 (!) 154/99 97 %   10/17/18 0310 97.7 °F (36.5 °C) 60 18 124/87 97 %   10/16/18 2329 97.7 °F (36.5 °C) 66 18 148/90 97 %   10/16/18 1952 97.6 °F (36.4 °C) 67 18 126/81 97 %   10/16/18 1523 97.8 °F (36.6 °C) 62 18 129/89 98 %   10/16/18 1311  66  147/82 93 %     Oxygen Therapy  O2 Sat (%): 98 % (10/17/18 1132)  Pulse via Oximetry: 93 beats per minute (10/16/18 1311)  O2 Device: Room air (10/16/18 1311)    Intake/Output Summary (Last 24 hours) at 10/17/2018 1239  Last data filed at 10/16/2018 1824  Gross per 24 hour   Intake 480 ml   Output    Net 480 ml       General appearance: Drowsy, but rouses to name quickly.  Confused, oriented to person only.  Cooperative but unable to follow commands.  Incontinent of urine.    Head: Normocephalic, without obvious abnormality, atraumatic  Eyes: conjunctivae/corneas clear. PERRL  Throat: Lips, mucosa, and tongue normal. Teeth and gums normal  Neck: supple, symmetrical, trachea midline, no JVD  Lungs: clear to auscultation bilaterally  Heart: regular rate and rhythm, S1, S2 normal, no murmur, click, rub or gallop  Abdomen: soft, non-tender. Bowel sounds normal. No masses,  no organomegaly  Extremities: extremities normal, atraumatic, no cyanosis or edema.  Muscle atrophy. Skin: Skin color, texture, turgor normal. No rashes or lesions  Neurologic: Grossly normal for patient.  No unilateral deficit. Discharge Info:   Current Discharge Medication List      START taking these medications    Details   senna-docusate (PERICOLACE) 8.6-50 mg per tablet Take 2 Tabs by mouth nightly. Qty: 60 Tab, Refills: 0         CONTINUE these medications which have CHANGED    Details   metoprolol tartrate (LOPRESSOR) 25 mg tablet Take 1 Tab by mouth daily. Qty: 30 Tab, Refills: 0      traZODone (DESYREL) 100 mg tablet Take 2 Tabs by mouth nightly as needed for Sleep. Qty: 30 Tab, Refills: 0         CONTINUE these medications which have NOT CHANGED    Details   donepezil (ARICEPT) 10 mg tablet Take 10 mg by mouth. divalproex DR (DEPAKOTE) 125 mg tablet Take 125 mg by mouth two (2) times a day. Indications: DEPRESSION ASSOCIATED WITH BIPOLAR DISORDER      omeprazole (PRILOSEC OTC) 20 mg tablet Take 20 mg by mouth daily. Qty: 31 Tab, Refills: 2      memantine (NAMENDA) 10 mg tablet Take 10 mg by mouth daily. amLODIPine (NORVASC) 10 mg tablet Take 5 mg by mouth daily.          STOP taking these medications       risperiDONE (RISPERDAL) 2 mg tablet Comments:   Reason for Stopping:          Disposition: SNF    Activity: Activity as tolerated and  Diet: DIET CARDIAC Mechanical Soft    Follow-up Information     Follow up With Specialties Details Why Contact Info    Your doctor  Schedule an appointment as soon as possible for a visit in 1 week If symptoms worsen or fail to improve     [address]) [facility]   [address] [facility] [address] [facility]  [phone]       Time spent in patient discharge planning and coordination 35 minutes.     Signed:  Angelic Ferraro NP

## 2018-10-17 NOTE — PROGRESS NOTES
Patient scheduled for transport to [facility] room #601 today at 1430 via stretcher by [facility] . Patient and wife Janet Tracey notified. Care Management Interventions PCP Verified by CM: Yes(Dr. Frazier at [facility]) Mode of Transport at Discharge: BLS(florian if going to STR) Transition of Care Consult (CM Consult): Discharge Planning, SNF(Anticipate STR to LTC) Discharge Durable Medical Equipment: No(To be ordered at SNF) Physical Therapy Consult: Yes Occupational Therapy Consult: Yes Speech Therapy Consult: Yes Current Support Network: Lives with Spouse, Family Lives Nearby(Living in [address] [place] [address] apartment with wife. ) Confirm Follow Up Transport: Family Plan discussed with Pt/Family/Caregiver: Yes Freedom of Choice Offered: Yes Discharge Location Discharge Placement: Skilled nursing facility([facility])

## 2018-10-17 NOTE — PROGRESS NOTES
Problem: Mobility Impaired (Adult and Pediatric) Goal: *Acute Goals and Plan of Care (Insert Text) LTG: 
(1.)Mr. Sima Kayser will move from supine to sit and sit to supine , scoot up and down and roll side to side in bed with MINIMAL ASSIST within 7 treatment day(s). (2.)Mr. Sima Kayser will transfer from bed to chair and chair to bed with MODERATE ASSIST using the least restrictive device within 7 treatment day(s). (3.)Mr. Sima Kayser will ambulate with MODERATE ASSIST for >or=10 feet with the least restrictive device, fair dynamic standing balance within 7 treatment day(s). (4.)Mr. Sima Kayser will tolerate sitting upright in bedside chair x 2-4 hours to increase tolerance for physical activity within 7 treatment days. (5.)Mr. Sima Kayser will perform B LE therapeutic exercises x 20 reps with MINIMAL ASSIST within 7 days to increase strength for improved safety and independence in transfers and gait. 
________________________________________________________________________________________________ PHYSICAL THERAPY: Daily Note, Treatment Day: 3rd, AM 10/17/2018INPATIENT: Hospital Day: 6 Payor: [facility] [place] MEDICARE / Plan: [place] / Product Type: Managed Care Medicare /  
  
NAME/AGE/GENDER: Jovana Okeefe is a 75 y.o. male PRIMARY DIAGNOSIS: Recurrent falls Rhabdomyolysis Non-traumatic rhabdomyolysis Non-traumatic rhabdomyolysis ICD-10: Treatment Diagnosis:  
 · Generalized Muscle Weakness (M62.81) · Other lack of cordination (R27.8) · Difficulty in walking, Not elsewhere classified (R26.2) · History of falling (Z91.81) Precaution/Allergies: 
Pcn [penicillins] and Pcn [penicillins] ASSESSMENT:  
Mr. Sima Kayser was supine in bed upon contact and agreeable to therapy. Patient demonstrates decreased arousal and delayed/decreased command following but has improved from previous treatments. Patient was Mod A X2 with bed mobility and all transfers.  Patient ambulated 61' with RW with min-Mod A with chair follow and verbal/manual cues for walker navigation. Patient was returned to recliner and completed 2X10 LE strengthening exercises with max verbal,manual, tactile cues for proper technique and to increase patients arousal to participate. Patient is progressing towards meeting ambulation goals, but no goals have been met today. Will continue efforts. This section established at most recent assessment PROBLEM LIST (Impairments causing functional limitations): 1. Decreased Strength 2. Decreased ADL/Functional Activities 3. Decreased Transfer Abilities 4. Decreased Ambulation Ability/Technique 5. Decreased Balance 6. Decreased Activity Tolerance 7. Decreased Flexibility/Joint Mobility 8. Decreased [place] with Home Exercise Program 
9. Decreased Cognition INTERVENTIONS PLANNED: (Benefits and precautions of physical therapy have been discussed with the patient.) 1. Balance Exercise 2. Bed Mobility 3. Family Education 4. Gait Training 5. Home Exercise Program (HEP) 6. Therapeutic Activites 7. Therapeutic Exercise/Strengthening 8. Transfer Training 9. Group Therapy TREATMENT PLAN: Frequency/Duration: 3 times a week for duration of hospital stayRehabilitation Potential For Stated Goals: Good RECOMMENDED REHABILITATION/EQUIPMENT: (at time of discharge pending progress): Due to the probability of continued deficits (see above) this patient will likely need continued skilled physical therapy after discharge. Equipment:  
? None at this time HISTORY:  
History of Present Injury/Illness (Reason for Referral): 
Per chart: Pt is a 74 yo male with pmh dementia with behavioral disturbances, HTN, CVA, CAD who has had multiple ER visits this year. He presented to ER today via EMS due to his wife unable to pick him up off floor after fall at home. Pt provides no history of event due to dementia.   Wife reports that pt fell around 0230 and she wasn't able to get him up until a family member came to visit hours later, when EMS was eventually called. Pt drowsy on arrival.  Lab work overall okay except CK elevated. Vitals stable. CT head without acute findings. He denies pain at this time. Family is hopeful for d/c to STR due to pt's weakness and recent falls.  
  
Past Medical History/Comorbidities:  
Mr. Marylin Butler  has a past medical history of CAD (coronary artery disease), Chronic back pain, Dementia, Hypertension, and Stroke [facility]). Mr. Marylin Butler  has a past surgical history that includes hx heent. Social History/Living Environment:  
Home Environment: Private residence # Steps to Enter: 0 Wheelchair Ramp: No 
One/Two Story Residence: One story Living Alone: No 
Support Systems: Spouse/Significant Other/Partner Patient Expects to be Discharged to[de-identified] Rehabilitation facility Current DME Used/Available at Home: [place], straight, Larry Mcardle, rollator Tub or Shower Type: Shower Prior Level of Function/Work/Activity: 
Pt required constant supervision but per wife, pt was able to ambulate with rollator in apartment and in community with only SBA. Pt states there have been no other falls. Pt requires assistance with ADLs. Personal Factors:   
      Sex:  male Age:  75 y.o. Number of Personal Factors/Comorbidities that affect the Plan of Care: 1-2: MODERATE COMPLEXITY EXAMINATION:  
Most Recent Physical Functioning:  
Gross Assessment: 
  
         
  
Posture: 
  
Balance: 
Sitting: Impaired Sitting - Static: Fair (occasional) Sitting - Dynamic: Fair (occasional) Standing: Impaired Standing - Static: Fair Standing - Dynamic : Poor Bed Mobility: 
Rolling: Moderate assistance;Assist x2 Supine to Sit: Moderate assistance;Assist x2 Scooting: Moderate assistance;Assist x2 Interventions: Safety awareness training;Verbal cues; Weight shifting training/Pressure relief Wheelchair Mobility: 
  
Transfers: 
Sit to Stand: Moderate assistance Stand to Sit: Moderate assistance Stand Pivot Transfers: Moderate assistance Level of Assistance: Moderate assistance Interventions: Verbal cues; Visual cues;Manual cues; Safety awareness training Gait: 
  
Base of Support: Narrowed Speed/Tatiaan: Slow;Shuffled Step Length: Right shortened;Left shortened Gait Abnormalities: Decreased step clearance;Shuffling gait; Step to gait Distance (ft): 60 Feet (ft) Assistive Device: Walker, rolling;Gait belt Ambulation - Level of Assistance: Minimal assistance; Moderate assistance Interventions: Tactile cues; Safety awareness training;Verbal cues Body Structures Involved: 1. Bones 2. Joints 3. Muscles Body Functions Affected: 1. Neuromusculoskeletal 
2. Movement Related Activities and Participation Affected: 1. General Tasks and Demands 2. Mobility 3. Self Care Number of elements that affect the Plan of Care: 4+: HIGH COMPLEXITY CLINICAL PRESENTATION:  
Presentation: Stable and uncomplicated: LOW COMPLEXITY CLINICAL DECISION MAKING:  
[facility] AM-PAC 6 Clicks Basic Mobility Inpatient Short Form How much difficulty does the patient currently have. .. Unable A Lot A Little None 1. Turning over in bed (including adjusting bedclothes, sheets and blankets)? [] 1   [x] 2   [] 3   [] 4  
2. Sitting down on and standing up from a chair with arms ( e.g., wheelchair, bedside commode, etc.)   [] 1   [x] 2   [] 3   [] 4  
3. Moving from lying on back to sitting on the side of the bed? [] 1   [x] 2   [] 3   [] 4 How much help from another person does the patient currently need. .. Total A Lot A Little None 4. Moving to and from a bed to a chair (including a wheelchair)? [] 1   [x] 2   [] 3   [] 4  
5. Need to walk in hospital room? [x] 1   [] 2   [] 3   [] 4  
6. Climbing 3-5 steps with a railing? [x] 1   [] 2   [] 3   [] 4  
© 2007, Trustees of [facility], under license to [employer]. All rights reserved Score:  Initial: 10 Most Recent: X (Date: -- ) Interpretation of Tool:  Represents activities that are increasingly more difficult (i.e. Bed mobility, Transfers, Gait). Score 24 23 22-20 19-15 14-10 9-7 6 Modifier CH CI CJ CK CL CM CN   
 
? Mobility - Walking and Moving Around:  
  - CURRENT STATUS: CL - 60%-79% impaired, limited or restricted  - GOAL STATUS: CL - 60%-79% impaired, limited or restricted  - D/C STATUS:  ---------------To be determined--------------- Payor: [facility] MEDICARE / Plan[place] [employer] [place] / Product Type: Managed Care Medicare /   
 
Medical Necessity:    
· Patient demonstrates fair rehab potential due to higher previous functional level. Reason for Services/Other Comments: 
· Patient continues to require present interventions due to patient's inability to function at baseline. Use of outcome tool(s) and clinical judgement create a POC that gives a: Questionable prediction of patient's progress: MODERATE COMPLEXITY  
  
 
 
 
TREATMENT:  
(In addition to Assessment/Re-Assessment sessions the following treatments were rendered) Pre-treatment Symptoms/Complaints:  Confused, delayed command following Pain: Initial:  
Pain Intensity 1: 0  Post Session:  0/10 Therapeutic Activity: (    15minutes): Therapeutic activities including bed mobility training, static/dynamic sitting/standing balance training, scooting, posture training, transfer training, ambulation on level ground, and patient education  to improve mobility, balance, coordination and tolerance for physical activity. Required moderate verbal, tactile, and manual cues cues Tactile cues; Safety awareness training;Verbal cues to promote static and dynamic balance in standing, promote coordination of bilateral, lower extremity(s) and promote functional independence.   
 
Therapeutic Exercise: (  15 Minutes):  Exercises per grid below to improve strength, balance and coordination. Required Mod A  visual, verbal and manual cues to promote proper body alignment, promote proper body posture and promote proper body mechanics. Progressed range, repetitions and complexity of movement as indicated. Date: 
10/16/18 Date: 
10/17/18 Date: Activity/Exercise Parameters Parameters Parameters AP 10 B AAROM 2x10 B AAROM   
LAQ 10 B AAROM 2x10 B AAROM Hip Abduction 10 B AAROM 2x10 B AAROM Marching  2x10 B AAROM Braces/Orthotics/Lines/Etc:  
· O2 Device: Room air Treatment/Session Assessment:   
· Response to Treatment:  See above · Interdisciplinary Collaboration:  
o Physical Therapy Assistant 
o SPTA and Nursing student · After treatment position/precautions:  
o Up in chair 
o Bed alarm/tab alert on 
o Bed/Chair-wheels locked 
o Bed in low position 
o Call light within reach 
o RN notified · Compliance with Program/Exercises: Will assess as treatment progresses. · Recommendations/Intent for next treatment session: \"Next visit will focus on advancements to more challenging activities and reduction in assistance provided\". Total Treatment Duration: PT Patient Time In/Time Out Time In: 1668 Time Out: 1100 Dougie Lima, LAITH

## 2018-10-18 ENCOUNTER — PATIENT OUTREACH (OUTPATIENT)
Dept: CASE MANAGEMENT | Age: 73
End: 2018-10-18

## 2018-10-18 NOTE — PROGRESS NOTES
Per [facility] patient discharged to [facility] a Preferred Provider [place] on 10/17/2018. Patient will be included in weekly care coordination calls, Care Coordinator will send patient discharge disposition to Tristan Phan RN CCM. This note will not be viewable in [address]